# Patient Record
(demographics unavailable — no encounter records)

---

## 2019-11-05 NOTE — [facility]
EXAM: Cervical spine, 4 views.

 

HISTORY: Pain status post fusion.

 

COMPARISON: 8/14/2019

 

FINDINGS: 4 views of the cervical spine are obtained. There is 

instrumented intraspinal fusion and interbody fusion at C5-C6. There is 

minimal retrolisthesis of C2 on C3. There is minimal endplate remodeling 

and there is mild facet arthropathy at multiple levels. There is partial 

visualization of median sternotomy wires. There is calcified plaque within

the carotid bifurcations.

 

IMPRESSION: 

1. Instrumented fusion at C5-C6.

2. Mild multilevel degenerative change.

3. No acute osseous finding.

 

Electronically signed by: Oly Simental MD (11/5/2019 1:24 PM) [facility]

## 2020-02-10 NOTE — [facility]
Cervical spine CT without contrast

 

History: Previous surgery, bilateral cervical radiculopathy 

 

Technique: Noncontrast CT imaging was performed of the cervical spine. 

Multiplanar images are reviewed.  Exposure: One or more of the following 

individualized dose reduction techniques were utilized for this 

examination:  1. Automated exposure control  2. Adjustment of the mA 

and/or kV according to patient size  3. Use of iterative reconstruction 

technique.

 

Comparison: August 14, 2019 exam; January 27, 2020

 

Findings: Cervical vertebral body stature is overall maintained. As seen 

on recent exam, there is anterior cervical fusion hardware at C5-6 and 

also C5-6 interbody graft, interbody fusion not apparent. There is again 

moderate degenerative disc disease at C6-7 and to lesser degree at C2-C3 

and C7-T1, minimally at C3-C4. AP alignment is unchanged, negligible 

posterior subluxation C6 relative to C7. There is adequate alignment of 

the lateral masses of C1 relative to C2. Occipital condylar-C1 

articulation is maintained. Atlantoaxial distance is within normal limits,

associated degenerative change.

 

There is atherosclerotic calcification of the carotid arteries in the neck

bilaterally.

 

C2-3: There is again minimal disc osteophyte complex and shallow 

protrusion. Central canal is again narrowed to about 7 to 8 mm. There is 

mild facet degenerative change bilaterally. Neural foramina are overall 

adequate.

 

C3-C4: There is again disc osteophyte complex and shallow central 

protrusion. Central canal is narrowed to about 8 mm. There is again 

mild-to-moderate facet degenerative change bilaterally. There is 

uncovertebral degenerative change greater on the left. There is again mild

narrowing of the left neural foramen, right neural foramen overall 

adequate.

 

C4-C5: There is again fairly severe left facet degenerative change, to 

lesser degree on the right. There is again very shallow posterior central 

protrusion. Central canal is borderline about 10 mm. There is mild neural 

foramina compromise bilaterally.

 

C5-C6: There are residual minimal posterior osteophytes, central canal 

minimally narrowed about 9 to 10 mm, previously greater degree of spinal 

stenosis by extrusion at this level on the August 2019 exam. There is 

again moderate to severe facet degenerative change bilaterally greater on 

the right. There is uncovertebral degenerative change. There is residual 

moderate to severe left and moderate right neural foramina compromise, 

decreased on the left in the interval.

 

C6-C7: There is again minimal disc osteophyte complex more eccentric to 

the left lateral recess. Central canal is borderline about 10 mm, again 

mild-to-moderate narrowing of the far left lateral recess. There is again 

fairly severe facet degenerative change bilaterally. There is 

uncovertebral degenerative change bilaterally, left greater than right. 

There are again larger osteophytes/disc osteophyte complex in the distal 

left neural foramen and proximal extraforaminal region. There is again 

severe narrowing of the left neural foramen greater distally. There is 

minimal narrowing of the right neural foramen.

 

C7-T1: Spinal canal and neural foramina are adequate.

 

Impression:

1. As seen on more recent exam, there is intact anterior cervical fusion 

hardware at the C5-6 level, also interbody graft at this level.

2. There is multilevel facet and uncovertebral degenerative change 

contributing to multilevel cervical neural foramina compromise as stated, 

more significant narrowing on the left at C6-7 and left greater than right

at C5-C6.

3. There is central canal stenosis on the order of 7-8 mm at C2-3 and to a

somewhat lesser degree at C3-4 as seen previously. There is 

mild-to-moderate narrowing of the far left lateral recess at C6-7 as seen 

previously. There is interval decreased, residual mild spinal stenosis at 

C5-6 by osteophytes.

4. There is multilevel cervical degenerative disc disease greatest at 

C6-7.

5. There is atherosclerotic calcification of the carotid arteries in the 

neck bilaterally.

 

 

 

Electronically signed by: Greg Vazquez MD (2/10/2020 5:03 PM) 

[facility]

## 2020-02-10 NOTE — [facility]
Cervical myelogram

 

History: Cervical radiculopathy

 

COMPARISON: November 5, 2019 radiographs

 

Technique: Patient was informed of the risks to include pain, infection, 

bleeding, seizures, allergic reaction, nerve root injury. All questions 

were answered. Patient signed a written consent form for a cervical 

myelogram. The patient was placed in a prone oblique position. The patient

was prepped and draped in the usual sterile fashion. 1% lidocaine was 

utilized for local anesthesia at the anticipated site of puncture of the 

right L2-L3 interlaminar space. A 19-gauge guiding needle was advanced 

into the soft tissues.   Through the guiding needle,  a 25  gauge Daniele

needle was advanced until there was  return of cerebral spinal fluid. 

Approximately 10 cc Omnipaque 300 were then injected during fluoroscopic 

visualization.  The needles were removed. Patient was repositioned so as 

to allow for the flow of contrast into the cervical spine. Fluoroscopic 

spot images and lateral radiograph were acquired. The patient was then 

transferred to the CT department for CT examination of the cervical spine.

There were no immediate complications.

 

Fluoroscopy time:  35 seconds, 6 images

 

Findings: There again has been anterior cervical fusion at C5-C6. There is

no evidence of myelographic block. There are likely some small anterior 

extradural defects such as C2-3 and C3-4, limited characterization of more

inferior levels due to overlying bone and soft tissues. There is 

multilevel cervical facet degenerative change. There is atherosclerotic 

calcification of the carotid arteries in the neck bilaterally. 

 

As seen on the injection images, there is multilevel lumbar degenerative 

disc disease and spondylosis.

 

Impression: 

 

1. There again has been anterior cervical fusion at C5-C6.

2. There is multilevel cervical facet degenerative change.

 

Electronically signed by: Greg Vazquez MD (2/10/2020 4:21 PM) 

[facility]

## 2020-02-21 NOTE — PAIN
DATE OF SERVICE:  02/20/2020



INITIAL CONSULTATION FOR PAIN CLINIC



CHIEF COMPLAINT:  Neck and bilateral upper extremity pain.



HISTORY OF PRESENT ILLNESS:  This is a 68-year-old male who presents with

history of pain in the base of the neck and bilateral upper extremities since

about 2009.  The patient underwent an anterior cervical diskectomy and fusion in

2015 with good results initially, but the pain returned a few months later and

it has been much worse over the past 2 months now.  The patient reports pain in

the base of the neck radiating to bilateral upper extremities, right essentially

equal to left without any loss of function, but significant fatigability in the

upper extremities as well as difficulty with fine motor movements such as

buttoning a shirt with his hands, especially with his right hand as he is right

hand dominant.  The patient reports he has had physical therapy in the past and

doing some stretching and strengthening exercises, but no formal physical

therapy recently and has been exercising on his own, also taking hydrocodone,

which does decrease the pain some as does Advil, which helps a small amount. 

The patient reports it awakens him from sleep about 3 times a night, can affect

his bowel or bladder control, but no loss of continence.  The patient reports it

does affect his ability to walk at times, but he is not using any assistive

devices.  No falls or stumbles.  The patient reports his disability rating from

0-10, 10 being the worst, is a 10 in all categories, family and home

responsibility, recreation, social activity, sexual behavior, occupation,

self-care and life support activities.  The patient did have MRI scan of the

cervical spine and myelogram of cervical spine also showing some degenerative

disk disease as well as spinal stenosis with previous intact cervical fusion

hardware at C5-C6, interbody graft at that level as well with multilevel facet

and uncovertebral degenerative change with cervical neural foraminal compromise,

more significant narrowing on the left at C6-C7 and left greater than right at

C5-C6 with spinal canal stenosis on the order 7-8 mm at the C2-C3 and somewhat

lesser degree at C3-C4.



PAST MEDICAL HISTORY:  Significant for hypertension, diabetes, hearing loss,

previous coronary artery bypass and reflux.



PAST SURGICAL HISTORY:  Other surgeries include a cochlear implant 2010,

cervical diskectomy and fusion in 2015.



CURRENT MEDICATIONS:  Include insulin, hydrocodone, Aciphex, aspirin, metformin,

rosuvastatin, tamsulosin and hydrochlorothiazide also multivitamins.



ALLERGIES:  The patient has no known drug allergies.



FAMILY HISTORY:  Significant for hypertension.



SOCIAL HISTORY:  The patient drinks alcohol about 3 drinks a week on average. 

Does not smoke, does not use any illegal, illicit or recreational drugs.  He is

single, lives locally in [place], [place].



REVIEW OF SYSTEMS:  The patient's review of systems is positive for those items

mentioned in history of present illness.  All systems reviewed and otherwise

negative.  It is complete, full and well documented on the patient's chart.



PHYSICAL EXAMINATION:

VITAL SIGNS:  The patient's blood pressure 166/75, pulse 73, respirations 18,

temperature 97.9 degrees Fahrenheit, height is 5 feet 10 inches, weight is 182

pounds.

GENERAL:  The patient is awake, alert, oriented, appropriate, very pleasant

demeanor.

HEENT:  Head shows normocephalic, atraumatic.  Extraocular movements are intact

and symmetrical.  Oral cavity:  Mucous membranes moist and pink.  Dentition is

intact.

NECK:  Shows anterior throat supple without palpable lymphadenopathy noted. 

Swallow reflex symmetrical.

CHEST:  Shows normal on inspection.  Breath sounds clear to auscultation

bilaterally.

HEART:  Shows S1, S2 clear.  No murmurs auscultated.

ABDOMEN:  Soft, nontender, nondistended.  No palpable organomegaly is noted.  No

rebound or guarding demonstrated.

BACK:  Shows spine grossly in the midline.  Cervical paraspinous muscle shows

symmetrical on inspection, on palpation shows some moderate tenderness

diffusely, but only diffusely without significant radiation.  The patient does

show good rotational motion of cervical spine, both laterally as well as

extension and flexion without significant difficulty.  The patient's back shows

good forward flexion and rearward extension as well without significant increase

in pain.

EXTREMITIES:  The patient's upper extremities show deep tendon reflexes at 2+ in

the biceps and triceps tendons.  Motor exam is strong with 5/5  strength,

bicep and tricep flexion symmetrical.  Peripheral pulses are 2+ radial

distribution.  No peripheral edema is noted.  Shoulder shrug is strong and

intact without loss of strength on resistance with some minor pain in the base

of the neck bilaterally and into the shoulders, but not into the upper

extremities.  This is true with abduction of shoulder to 90 degrees without loss

of strength on resistance, but with good strength bilaterally.  The patient's

skin shows warm and dry, good turgor.  No edema.  No sores, rashes or bruising

throughout.  Also the patient does have cochlear implant with external battery

on the left.



IMPRESSION:

1.  This is a 68-year-old male with approximate 2-month history of increasing

pain recently in the base of neck and upper extremities bilaterally in a

radicular fashion.

2.  MRI and myelogram of cervical spine as noted.

3.  Hypertension.

4.  Diabetes.



PLAN:  Options were discussed with the patient including conservative medical

managements, physical therapies and interventional techniques.  He would like to

pursue interventional techniques.  We discussed a cervical epidural steroid

injection using description as well as anatomical models to describe the

procedure.  Risks were then discussed including, but not limited to bleeding,

infection, possibility of epidural hematoma, subsequent neurological compromise,

dural puncture, headaches, spinal cord and/or nerve damage, side effects of

steroid medication and poor results regarding pain control.  The patient

understands and wished to proceed.  The patient will return to clinic in

approximately 2 weeks for followup.  He was counseled on return appointment,

activity level and side effects to be aware of.



DIAGNOSES:  Cervical radiculopathy with cervical degenerative disk disease,

cervical spinal stenosis and post-cervical laminectomy syndrome.



PROCEDURE:  Cervical epidural steroid injection, translaminar approach C6-C7

level using C-arm fluoroscopic guidance under sterile prep and drape using local

anesthetic.



MEDICATION INJECTED:  A total of 120 mg Depo-Medrol plus 5 mL of

preservative-free normal saline and 2 mL of contrast.



CONDITION AT DISCHARGE:  Stable.  The patient tolerated the procedure well, had

no complications.

 



______________________________

WALTER SMITH MD



DR:  FAHAD/alecia  JOB#:  [id number] / [id number]

DD:  02/20/2020 15:30  DT:  02/21/2020 00:35

## 2020-03-05 NOTE — PN
DATE:  03/05/2020



PROGRESS NOTE FOR PAIN CLINIC



DIAGNOSES:  Cervical radiculopathy with cervical degenerative disk disease,

cervical spinal stenosis and post-cervical laminectomy syndrome.



HISTORY OF PRESENT ILLNESS:  The patient is a 68-year-old male who returns for

followup status post cervical epidural steroid injection x 1.  The patient

reports only minimal decrease in pain in his neck and bilateral upper

extremities.  The patient reports still significant pain with any activity,

weight lifting any type of weightbearing with the upper extremities, rather

patient reports it is keeping him awake at night, occasionally awakens him, but

not most nights.  The patient reports his pain is a 10 on a scale of 10 at all

times, average, worst and least and has tingling and burning sensation in the

base of neck and shoulders, upper extremities bilaterally, radiating, constant,

becoming severe.  The patient reports no new motor or sensory deficits, no new

changes.



PHYSICAL EXAMINATION:

VITAL SIGNS:  The patient's blood pressure is 173/77, pulse 74, respirations 20,

temperature 98.2 degrees Fahrenheit, weight is 180 pounds.

GENERAL:  The patient is awake, alert, oriented, appropriate, very pleasant

demeanor.

HEENT:  Head shows normocephalic, atraumatic.  Extraocular movements are intact

and symmetrical.  Oral cavity:  Mucous membranes moist and pink.  Dentition is

intact.

NECK:  Shows anterior throat supple without palpable lymphadenopathy noted. 

Swallow reflex symmetrical.

CHEST:  Shows normal on inspection.  Breath sounds clear bilaterally.

HEART:  Shows S1, S2 clear.  No murmurs auscultated.

ABDOMEN:  Soft, nontender, nondistended.

BACK:  Shows spine grossly in the midline.  Normal appearing thoracic kyphosis

and lumbar lordotic curvature.  Cervical lordotic curvature is slightly

flattened.  Cervical paraspinous muscle shows symmetrical with inspection, on

palpation shows some moderate tenderness diffusely bilaterally, but only

diffusely in the low cervical paraspinous muscles without radiation.  The

patient does show good rotational motion of cervical spine, both laterally as

well as extension and flexion without significant difficulty.

EXTREMITIES:  Upper extremities show deep tendon reflexes 2+ in the biceps and

triceps tendons.  Motor exam is strong with  strength rated at 5/5 and

equal.  Peripheral pulses are 2+.  No peripheral edema is noted.



Options were discussed with the patient.  The patient's old chart was reviewed,

his current medication regimen updated.  Current review of systems updated today

as well.  We will proceed with a second in a series of cervical epidural steroid

injection today with fluoroscopic guidance.  Risks were again discussed

including, but not limited to bleeding, infection, possibility of epidural

hematoma, subsequent neurological compromise, dural puncture, headaches, spinal

cord and/or nerve damage, side effects of steroid medication and poor results

regarding pain control.  The patient understands and wished to proceed.  The

patient will return to clinic in approximately 2 weeks for follow up.  He was

counseled on return appointment, activity level and side effects to be aware of.



DIAGNOSES:  Cervical radiculopathy with cervical degenerative disk disease,

cervical spinal stenosis and post-cervical laminectomy syndrome.



PROCEDURE:  Cervical epidural steroid injection, translaminar approach C6-C7

level using C-arm fluoroscopic guidance under sterile prep and drape using local

anesthetic.



MEDICATION INJECTED:  A total of 120 mg Depo-Medrol plus 5 mL of

preservative-free normal saline and 2 mL of contrast.



CONDITION AT DISCHARGE:  Stable.  The patient tolerated procedure well, had no

complications.

 



______________________________

WALTER SMITH MD



DR:  FAHAD/alecia  JOB#:  [id number] / [id number]

DD:  03/05/2020 14:35  DT:  03/05/2020 19:58

## 2020-09-17 NOTE — RAD
EXAM: Limited bone scintigraphy..

 

HISTORY: Neck pain.

 

COMPARISON: 09/17/2020 CT.

 

FINDINGS: 25 mCi Tc-99m MDP was administered intravenously. Scintigraphic 

images of the cervical spine were obtained in multiple projections.

 

There is focal uptake within the lower cervical spine corresponding with 

anterior cervical discectomy and fusion changes at C5-6. There is no 

significant uptake elsewhere in the cervical spine.

 

Uptake at the sternoclavicular and acromioclavicular joints is consistent 

with osteoarthritis. Uptake along the superior alveolar ridge is likely 

odontogenic. There is no evidence of scintigraphic osseous metastatic 

disease.

 

IMPRESSION: 

1. Moderate to intense uptake at the site of C5-6 anterior cervical 

discectomy and fusion is greater than expected given chronicity placement.

This is consistent with residual motion or infection.

 

Electronically signed by: MARBELLA Paula MD (9/17/2020 6:22 PM) 

[facility]

## 2020-09-17 NOTE — RAD
Cervical spine CT without contrast

 

History:Cervical radiculopathy 

 

Technique: Noncontrast CT imaging was performed of the cervical spine. 

Multiplanar images are reviewed.  Exposure: One or more of the following 

individualized dose reduction techniques were utilized for this 

examination:  1. Automated exposure control  2. Adjustment of the mA 

and/or kV according to patient size  3. Use of iterative reconstruction 

technique.

 

Comparison: 2/10/2020

 

Findings: There is again anterior metallic plate and screws at C5-6, also 

C5-6 interbody graft although interbody fusion not apparent. Cervical 

vertebral body stature and AP alignment are maintained. There is again 

moderate degenerative disc disease C6-7, to a lesser degree at C2-3, C3-4,

and C7-T1. No acute cervical spine fracture is identified. Atlantoaxial 

distance is within normal limits. Occipital condylar-C1 articulation is 

preserved. There is adequate alignment of the lateral masses of C1 

relative to C2. There is some atherosclerotic calcification of the carotid

arteries in the neck bilaterally. There is again degree of diffuse 

cervical spinal stenosis on developmental basis.

 

C2-3: There is again disc osteophyte complex and superimposed protrusion 

with resultant central canal stenosis estimated about 6 to 7 mm. Neural 

foramina are adequate. There is facet degenerative change.

 

C3-4: There is again disc osteophyte complex and protrusion, central canal

likely narrowed to about 8 mm. There is again facet degenerative change, 

also uncovertebral degenerative change greater on the left. There is 

probable mild-to-moderate narrowing of the left neural foramen.

 

C4-5: There is again bilateral facet degenerative change. There is likely 

shallow posterior protrusion as seen previously, likely borderline central

canal stenosis about 7 mm. There is probable mild bilateral neural 

foramina compromise.

 

C5-6: There is again overall borderline to mild narrowing of the central 

canal by posterior osteophytes. There is bilateral facet and uncovertebral

degenerative change. There is fairly severe left and likely moderate right

neural foramina compromise.

 

C6-7: There is again disc osteophyte complex, likely borderline central 

canal stenosis and degree of narrowing of the far left lateral recess. 

There is left greater than right uncovertebral degenerative change, also 

bilateral facet degenerative change. There is again severe narrowing of 

the left neural foramen, mild narrowing on the right.

 

C7-T1: Neural foramina and spinal canal are adequate.

 

Impression:

1. Findings are fairly similar compared with previous February 2020 exam. 

There is again anterior cervical fusion hardware at C5-6, interbody graft 

at this level although interbody fusion not apparent. There is other 

cervical degenerative disc disease greatest at C6-7, also multilevel 

spondylosis. There is spinal stenosis about 6 to 7 mm at C3-4, to a 

somewhat lesser degree at C3-4 and C4-5 as described.

2. Facet and uncovertebral degenerative change contributes to multilevel 

cervical neural foramina compromise, more significant narrowing on the 

left at C5-6 and C6-7.

 

 

 

Electronically signed by: Greg Vazquez MD (9/17/2020 5:25 PM) [id number]

## 2020-10-12 NOTE — EKG
[facility]

              [address] [place] [address]

Test Date:    2020-10-12               Test Time:    15:29:51

Pat Name:     HERNANDO LOZANO            Department:   

Patient ID:   [id number]           Room:          

Gender:       M                        Technician:   

:          1951               Requested By: JANNIE SANTANA

Order Number: [id number]           Reading MD:   Severino Dee

                                 Measurements

Intervals                              Axis          

Rate:         61                       P:            41

ID:           [id number]                      QRS:          38

QRSD:         102                      T:            58

QT:           430                                    

QTc:          434                                    

                           Interpretive Statements

SINUS RHYTHM

BORDERLINE INCOMPLETE RIGHT BUNDLE BRANCH BLOCK

Electronically Signed On 10- 10:27:16 CDT by Severino Dee

## 2020-11-23 NOTE — RAD
MR#: [id number]

Account#: [id number]

Accession#: [id number]

Date of Study: 11/23/2020

Ordering Physician: JUAN PABLO JETT, 

Referring Physician: JUAN PABLO JETT, 

Tech: Paulette Tim RT R, [facility]





--------------- APPROVED REPORT --------------





Patient Location : OUT-PATIENT



Indications

Lower Extremity Pain : Bilateral



Deep System

Deep Venous Thrombosis present : No

Deep Venous Reflux present : No



Findings

Grayscale images demonstrate no gross evidence of thrombus in the saphenofemoral junctions.  The bila
teral greater saphenous veins did not demonstrate any evidence of reflux.  The right great saphenous 
vein measures 4.4 mm the left great saphenous vein measures 5 mm.



The right lesser saphenous vein does not show any evidence of reflux and it measures approximately 2.
7 mm.  The left lesser saphenous vein is not well visualized and appears to be consistent with prior 
thrombosis.



Critical Notification

Critical Value: No



<Conclusion>

1.  No evidence of reflux in the bilateral greater and right lesser saphenous veins.  Probable chroni
c thrombotic occlusion of the left lesser saphenous vein (superficial vein).



Signed by : Juan Pablo Jett, 

Electronically Approved : 11/23/2020 17:51:06

## 2020-11-23 NOTE — RAD
INDICATION: Carotid bruit

 

COMPARISON: None.

 

TECHNIQUE: Color, grayscale and doppler ultrasound images obtained of the 

carotid system bilaterally.  Percent stenosis is estimated using criteria 

that correlates with NASCET methodology.

 

FINDINGS:

 

Peak systolic velocities are as follows in cm/s:

 

Right Carotid System:

CCA: 68

ICA: 119



 

ICA/CCA Ratio 1.2

 

Left Carotid System:

CCA: 138

ICA: 142



 

ICA/CCA Ratio 0.9

 

Vertebral arteries are antegrade bilaterally.

Multifocal plaque within the carotid system bilaterally.

 

IMPRESSION:

 

*  No hemodynamically significant stenosis of the internal carotid 

arteries bilaterally.

 

*  Elevated velocities within the external carotid arteries therefore a 

site of narrowing may be present.

 

Electronically signed by: Landon Newell MD (11/23/2020 4:56 PM) [id number]

## 2021-01-04 NOTE — RAD
MR#: [id number]

Account#: [id number]

Accession#: [id number]

Date of Study: 12/29/2020

Ordering Physician: JUANP ABLO JETT, 

Referring Physician: JUAN PABLO JETT, 

Tech: Donald Goldstein MBA, RDMS, RVT, RDCS, RTR





--------------- APPROVED REPORT --------------





Patient Location: OUT-PATIENT



Indications

Claudication:Bilaterally                         



Findings

Abnormal bilateral ABIs with the following results:



Right arm 148, left arm 139



Right ankle PT 87, left ankle PT 99



Index 0.58 on the right side and 0.66 on the left



Critical Notification

Critical Value: No



<Conclusion>

1.  Severe bilaterally abnormal ABIs as noted above.



Signed by : Juan Pablo Jett, 

Electronically Approved : 01/04/2021 09:17:17

## 2021-01-04 NOTE — RAD
MR#: [id number]

Account#: [id number]

Accession#: [id number]

Date of Study: 12/29/2020

Ordering Physician: JUAN PABLO JETT, 

Referring Physician: JUAN PABLO JETT, 

Tech: Donald Goldstein MBA, RDMS, RVT, RDCS, RTR





--------------- APPROVED REPORT --------------





Patient Location: OUT-PATIENT



Indications

Claudication:Bilaterally                         



VELOCITY AND DOPPLER WAVEFORM ANALYSIS

RIGHT cm/secWaveformSeverity LEFT cm/secWaveform Severity 

dCFA 145.0BiphasicdCFA 132.0Monophasic

Prof Fem Art. 71.0MonophasicProf Fem Art. 55.0Monophasic

Fem Art Prox. 66.0MonophasicFem Art Prox. 50.0Monophasic

Fem Art Mid. OccludedFem Art Mid. 208.0Monophasic

Fem Art Dist. 55.0MonophasicFem Art Dist. 87.0Monophasic

Pop Art(Fossa) 34.0MonophasicPop Art(AK) 49.0Monophasic

PTA Prox. 57.0MonophasicPTA Prox. 37.0Monophasic

PTA Dist. 28.0MonophasicPTA Dist. 35.0Monophasic

Per Art Mid. 17.0MonophasicPer Art Mid. 25.0Monophasic

VANESSA Prox. 16.0MonophasicATA Prox. 26.0Monophasic

DPA OccludedDPA 34Monophasic



Findings

Grayscale images of the bilateral lower extremity arterial vessels reveals diffuse moderate atheroscl
erotic plaque with severe plaque localized in the areas of the bilateral superficial femoral arteries
.



On the right side the mid SFA is occluded.  There are diminished monophasic waveforms below the knee 
consistent with more proximal occlusion.  There is three-vessel runoff below the knee but with decrea
sed velocities.



On the left side there is likely a greater than 50% stenosis involving the mid SFA.  Otherwise there 
is three-vessel runoff below the knee although with monophasic waveforms and diminished velocities.



Critical Notification

Critical Value: No



<Conclusion>

1.  Severe bilateral SFA disease



Signed by : Juan Pablo Jett, 

Electronically Approved : 01/04/2021 09:19:16

## 2021-01-18 NOTE — KCIC
EXAM: Cervical spine, 3 views.



HISTORY: Fusion.



COMPARISON: 2/10/2020



FINDINGS: 3 views of the cervical spine are obtained. There is instrumented anterior spinal fusion an
d interbody fusion at C5-C6 and instrumented posterior spinal fusion at C5-T1. There is mild retrolis
thesis of C2 on C3. There is multilevel facet arthropathy. There is calcified scar plaque involving t
he carotid bifurcations. There are median sternotomy wires.



IMPRESSION: 

1. Instrumented fusion at C5-T1, described above. There is no evidence of instrumentation loosening.

2. No acute osseous finding.



Electronically signed by: Oly Simental MD (1/18/2021 1:10 PM) [id number]

## 2021-06-24 NOTE — RAD
XR FOOT_RIGHT 3 VIEWS



History: Reason: pain swelling / Spl. Instructions:  / History: 



Technique: 3 views right foot



Comparison: None.



Findings:

Normal alignment. No fracture. Soft tissues unremarkable. Postop changes talocalcaneal arthrodesis. P
lantar calcaneal spur. Mild midfoot DJD. Vascular calcifications.



Impression: 

1.  No acute osseous abnormality.

2.  Postoperative changes of the hindfoot.

3.  Mild midfoot DJD.



Electronically signed by: Catarino West DO (6/24/2021 9:49 PM) [facility]
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
## 2021-06-25 NOTE — PDOC1
H & P.


DATE OF SERVICE:


DATE: 6/25/21 


TIME: 07:13





HPI:


Mr. Umanzor is a 70 year old male with history of diabetes, complicated by 

chronic kidney disease stage IIIA, peripheral artery disease hypertension, 

hyperlipidemia, coronary disease status post CABG, GERD, BPH, opioid dependence 

who presents to the ED with 1 week history of right foot pain and swelling, 

worsening over that timeframe. Pain is located on the lateral aspect of the foot

as well as the dorsal aspect. Patient denies nausea, vomiting, fever, chills, 

chest pain, shortness of breath, abdominal pain.  Labs remarkable for mild 

leukocytosis, elevated CRP and ESR.  Creatinine is 1.5, which is consistent with

his baseline.  He was admitted for further evaluation and management.  He was 

given vancomycin in the ED.





ROS:


Constitutional: Denies fever, fatigue, chills


HEENT: Denies sore throat, vision changes


Cardio: Denies chest pain, dyspnea with exertion, syncope, palpitations, edema


Pulmonary: Denies shortness of breath, cough, wheezing


GI: Denies nausea, vomiting, diarrhea, constipation


: Denies dysuria, frequency, urgency, incontinence


Skin: Denies new lesions


Neuro: Admits diabetic peripheral neuropathy


MSK: R foot pain





ED COURSE:


As above





PMH:


As above





FAMILY HX:


Noncontributory





SOCIAL HX:


Non-smoker, no significant alcohol or drug use.





SURGICAL HX:


CABG 2012, cervical fusion in 2019, ankle surgery in 2018, tonsillectomy.





MEDS:


Reviewed and reconciled





ALLERGIES:


Reviewed





PE:


Alert, oriented, no acute distress


EOMI, sclera non-icteric


Neck supple


RRR, no murmur


CTAB, no wheezes, crackles or rhonchi


Soft, NT, ND, normal bowel sounds


R dorsal foot with erythema on lateral aspect of the foot, tender and warm to 

touch


Blister with likely underlying R lateral foot wound of uncertain depth


1+ dp pulse on R, unable to appreciate posterior tibial


No edema, cyanosis. Normal capillary refill.


Calm, cooperative, mood/affect within normal limits





ASSESSMENT & PLAN:


Cellulitis of right foot


Blister on lateral plantar R foot with likely underlying R food wound of unknown

depth


Chronic kidney disease stage IIIa, at baseline


Diabetes, mildly uncontrolled, last A1c was 8%


Hypertension


Peripheral artery disease


Hyperlipidemia


Coronary artery disease status post CABG


GERD


BPH


Opioid dependence





Continue home medications


ID consult for antibiotic guidance given chronic kidney disease


Home pain medications corrected to hydrocodone 10/325, patient is not taking 

oxycodone ER


Mild hypokalemia, patient taking HCTZ, will decrease dose and add amlodipine.  

Patient is intolerant of ACE inhibitors due to lower GFR.


Wound care to eval R foot blister/wound


Arterial doppler of RLE d/t decreased pulse





Justifications for Admission


Other Justification














BREANNA HUERTAS MD            Jun 25, 2021 07:23

## 2021-06-25 NOTE — CONS
DATE OF CONSULTATION: 06/25/2021

REFERRING PHYSICIAN:  Dr. Umanzor.



REASON FOR CONSULTATION:  Right foot deep tissue infection.



HISTORY OF PRESENT ILLNESS:  A 70-year-old male with history of diabetes, 

neuropathy, CKD, peripheral arterial disease, hypertension, hyperlipidemia, 

coronary artery disease, GERD, BPH presented to the ER with complaints of right 

foot pain and swelling.  He felt like he had put his foot on a splinter, pain, 

swelling, redness started getting worse.  He denied any fevers, chills, nausea, 

vomiting, headache, sore throat, diarrhea, abdominal pain or  symptoms.  He 

had a white count of 11.9.  Elevated CRP and ESR.  The patient has CKD.  

Creatinine was 1.5.  Foot x-ray showed no acute osseous abnormality, 

postoperative changes of the hindfoot, mild midfoot DJD.  The patient received a

dose of vancomycin.  ID consultation was requested for antibiotic management.  

Today, the patient basically feels the same.



PAST MEDICAL HISTORY:  Diabetes with neuropathy, peripheral arterial disease, 

hypertension, hyperlipidemia, GERD, BPH, status post CABG, history of a left 

ankle surgery about 4 years ago with hardware in place.



FAMILY HISTORY:  Noncontributory.



SOCIAL HISTORY:  Denies smoking, EtOH, or illicit drug use.  Lives with wife at 

home.



PAST SURGICAL HISTORY:  Reviewed.



ALLERGIES:  Reviewed.



CURRENT MEDICATIONS:  Antibiotics, none.  One dose of vancomycin.  Other 

medications reviewed.



PHYSICAL EXAMINATION:

VITAL SIGNS:  Temperature 97.4, pulse 59, respirations 16, blood pressure 

172/69, oxygen saturation 96% on room air.

GENERAL:  Alert, oriented x 3 male lying in bed comfortably, in no acute 

distress.

HEENT:  Normocephalic, atraumatic.  Anicteric.

NECK:  Supple, no JVD.

LUNGS:  Clear bilaterally.  No wheezing.

HEART:  S1, S2.  No gallops or murmurs.

ABDOMEN:  Soft, nontender, nondistended, no guarding.

EXTREMITIES:  Right foot swelling mainly over the dorsum.  There is a blister on

the left fifth metatarsal area.  No draining sinuses noted. Tenderness and 

warmth noted.  No calf tenderness.

NEUROLOGIC:  Alert and oriented x 3, grossly nonfocal.

PSYCHIATRIC:  Calm and cooperative.



LABORATORY DATA:  WBC 11.9, hemoglobin 13, hematocrit 38, platelets 256.  ESR 

28.  Sodium 140, potassium 3.4, chloride 102, bicarbonate 26, BUN 22, creatinine

1.5; baseline is between 1.4-1.6. Glucose 155.  Lactate of 0.6.  C-reactive 

protein 7.7.



IMAGING:  Foot x-ray as above.  Doppler ultrasound arterial is pending at this 

time.



IMPRESSION:

1.  Cellulitis of the right foot.Blister with possible underlying callus on the 
lateral aspect of the right foot,

2.  History of right ankle surgery with hardware in place; Concern for deep 
tissue infection.

3.  Chronic kidney disease.

4.  Diabetes mellitus with neuropathy, uncontrolled.

5.  Hypertension/hyperlipidemia.

6.  Peripheral arterial disease.

7.  Coronary artery disease, status post bypass.

8.  Benign prostatic hypertrophy.



RECOMMENDATIONS:

1.  Obtain CT of right lower extremity.

2.  Start daptomycin and Zosyn.

3.  The patient may need Vascular or Ortho consultation.

4.  Elevate right lower extremity.

5.  Follow up blood cultures.

6.  Monitor labs.



Thank you, Dr. Umanzor, for consulting Infectious Disease to participate in this 

patient's care.  If you have any questions, do not hesitate to contact me.







CHRISSY/VIS/RICH

DR: Rubens   DD: 06/25/2021 10:43

DT: 06/25/2021 11:06   TID: [id number]

GISEL

## 2021-06-25 NOTE — RAD
Exam:  US DPLX ARTR EXTREM LOWER BILAT



History: Reason: decreased pulses, hx of pad, wound / Spl. Instructions:  / History: 



Comparison:  Lower extremity arterial ultrasound 12/29/2020.



Technique: Grayscale, color, and spectral Doppler ultrasound images of the lower extremity arteries.



Findings: Peak systolic velocities (cm/s) and waveforms in the lower extremities:



Right:

Common femoral artery: 178, monophasic

Profunda femoris artery: 112, monophasic

Proximal superficial femoral artery: 126, monophasic

Mid superficial femoral artery: Occluded

Distal superficial femoral artery: Occluded 

Popliteal artery: 115, monophasic

Posterior tibial artery: 63 proximally and 37 distally, monophasic 

Peroneal artery: 28, monophasic 

Anterior tibial artery: 35, monophasic 

Dorsalis pedis artery: 10, monophasic 



Left:

Common femoral artery: 171, monophasic

Profunda femoris artery: 95, monophasic

Proximal superficial femoral artery: 99, monophasic

Mid superficial femoral artery: 42, monophasic

Distal superficial femoral artery: 68, monophasic

Popliteal artery: 87, monophasic

Posterior tibial artery: 54 proximally and 34 distally, monophasic

Peroneal artery: 37, monophasic

Anterior tibial artery: 88, monophasic

Dorsalis pedis artery: 34, monophasic



Impression:

1. Peripheral arterial disease bilaterally with abnormal monophasic waveforms throughout both lower e
xtremities suspicious for aortoiliac disease. Consider a CT angiogram or conventional angiogram to fu
rther evaluate.

2. 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

3. 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 
and dorsalis pedis arteries.



Electronically signed by: Kareen Palma MD (6/25/2021 11:51 AM) [id number]

## 2021-06-25 NOTE — RAD
STUDY: CT of right lower extremity without contrast



INDICATION: Right foot infection, deep, history of surgery



COMPARISON: Right foot radiograph 6/24/2021



TECHNIQUE: Axial CT imaging of the right ankle and foot performed without contrast. Coronal and sagit
angela reformats were obtained.



One or more of the following individualized dose reduction techniques were utilized for this examinat
ion:  



1. Automated exposure control

2. Adjustment of the mA and/or kV according to patient size

3. Use of iterative reconstruction technique.



FINDINGS:



Bones: 



There are surgical changes of the talar arthrodesis. Screws traversing the subtalar joint are intact.
 There is osseous bridging across the joint. A lucent tract just cranial and anterior to the tip of t
he more inferolateral screw may be from prior hardware. There is a lucent tract extending from the ti
p of the more medial screw to the medial talar dome, also possibly sequela of previously removed hard
ware. There are small subchondral cysts in the lateral talar dome. No acute fracture. Mild degenerati
ve joint disease of the midfoot. There is no osseous destruction to suggest acute osteomyelitis.



Mild thickening of the peroneal tendons and Achilles tendons may reflect tendinopathy. The medial fle
xor tendons are grossly intact. There is subcutaneous edema and soft tissue thickening at the anterio
r aspect of ankle with small radiopaque densities along the course of the extensor hallucis longus te
ndon, which is difficult to visualize in this region but intact distally. Correlate for postoperative
 changes. The remaining extensor tendons are intact. Mild diffuse subcutaneous edema, greatest dorsal
ly and laterally. There is no definite drainable fluid collection. No definite joint effusion. Diffus
e muscular atrophy. No soft tissue gas. Vascular calcifications are noted.



IMPRESSION:



1. No osseous destruction to suggest osteomyelitis. There is mild diffuse subcutaneous edema. No drai
nable fluid collection or soft tissue gas.



2. Surgical changes of subtalar arthrodesis. No definite hardware complication. There are cystic weiner
ges at the lateral talar dome which may be degenerative or osteochondral lesion. Mild degenerative manoj
int disease of the midfoot.



3. Thickening of the peroneal tendons and Achilles tendon suspicious for tendinopathy.



4. Subcutaneous edema or soft tissue thickening with small radiopaque densities at the anterior tibio
talar joint along the extensor hallucis longus tendon. The tendon itself is difficult to visualize in
 this region but intact distally. Correlate for prior debridement or tendon repair in this region.



Electronically signed by: Kareen Palma MD (6/25/2021 1:14 PM) [id number]

## 2021-06-26 NOTE — PDOC
Infectious Disease Note


Subjective:


Subjective


Patient feels better today


Denies any complaints





Vital Signs:


Vital Signs





Vital Signs








  Date Time  Temp Pulse Resp B/P (MAP) Pulse Ox O2 Delivery O2 Flow Rate FiO2


 


6/26/21 07:00 98.5 64 18 146/59 (88) 97 Room Air  





 98.5       











Physical Exam:


PHYSICAL EXAM


GENERAL:  Alert, oriented x 3 male lying in bed comfortably, in no acute 


distress.


HEENT:  Normocephalic, atraumatic.  Anicteric.


NECK:  Supple, no JVD.


LUNGS:  Clear bilaterally.  No wheezing.


HEART:  S1, S2.  No gallops or murmurs.


ABDOMEN:  Soft, nontender, nondistended, no guarding.


EXTREMITIES:  Right foot swelling mainly over the dorsum slightly improved.  

There is a blister 


With possible underlying callus on the left fifth metatarsal area.  No draining 

sinuses noted. Tenderness and 


warmth noted.  No calf tenderness.


NEUROLOGIC:  Alert and oriented x 3, grossly nonfocal.


PSYCHIATRIC:  Calm and cooperative.





Medications:


Inpatient Meds:


Medications reviewed.





Labs:


Lab





Laboratory Tests








Test


 6/25/21


12:37 6/25/21


17:42 6/25/21


20:21


 


Glucose (Fingerstick)


 165 mg/dL


(70-99) 171 mg/dL


(70-99) 168 mg/dL


(70-99)








Micro


CT RLE


IMPRESSION:





1. No osseous destruction to suggest osteomyelitis. There is mild diffuse 

subcutaneous edema. No drainable fluid collection or soft tissue gas.





2. Surgical changes of subtalar arthrodesis. No definite hardware complication. 

There are cystic changes at the lateral talar dome which may be degenerative or 

osteochondral lesion. Mild degenerative joint disease of the midfoot.





3. Thickening of the peroneal tendons and Achilles tendon suspicious for 

tendinopathy.





4. Subcutaneous edema or soft tissue thickening with small radiopaque densities 

at the anterior tibiotalar joint along the extensor hallucis longus tendon. The 

tendon itself is difficult to visualize in this region but intact distally. 

Correlate for prior debridement or tendon repair in this region.





Objective:


Assessment:





1.  Cellulitis of the right foot.Blister with possible underlying callus on the 

lateral aspect of the right foot,


2.  History of right ankle surgery with hardware in place; Concern for deep 

tissue infection.


3.  Chronic kidney disease.


4.  Diabetes mellitus with neuropathy, uncontrolled.


5.  Hypertension/hyperlipidemia.


6.  Peripheral arterial disease.


7.  Coronary artery disease, status post bypass.


8.  Benign prostatic hypertrophy.





Plan:


Plan of Care


Continue daptomycin and Zosyn.


Wound care as directed by wound team


Consult vascular surgery


Elevate right lower extremity.


 Follow up blood cultures.


 Monitor labs.


Discussed with wife at bedside


Discussed with nursing staff











MOHINI GARCÍA MD           Jun 26, 2021 08:28

## 2021-06-26 NOTE — PDOC
Provider Note


Date of Service:


DATE: 6/26/21 


TIME: 14:44


Provider Note


vss, no temp, labs same, glucose ok on current insulin- cults neg, R foot seems 

better- will open blister in am and culture fluid





Justifications for Admission


Other Justification














PAULETTE MERA MD             Jun 26, 2021 14:55

## 2021-06-27 NOTE — PDOC
Provider Note


Date of Service:


DATE: 6/27/21 


TIME: 07:56


Provider Note


no temp, glucose control good, labs ok- R foot opened re 15 blade, thin dark 

blood/no pus, cultured and dressed- discussed need for vasc consult as he has 

bilat IC in both thighs, likely distal aortic disease-





Justifications for Admission


Other Justification














PAULETTE MERA MD             Jun 27, 2021 08:17

## 2021-06-27 NOTE — PDOC
Infectious Disease Note


Subjective:


Subjective


Patient feels better 


Underwent blister drainage by Dr. Newell earlier today





Vital Signs:


Vital Signs





Vital Signs








  Date Time  Temp Pulse Resp B/P (MAP) Pulse Ox O2 Delivery O2 Flow Rate FiO2


 


6/27/21 08:28  66  161/69    


 


6/27/21 08:00      Room Air  


 


6/27/21 08:00 98.1  18  98   





 98.1       











Physical Exam:


PHYSICAL EXAM


GENERAL:  Alert, oriented x 3 male lying in bed comfortably, in no acute 


distress.


HEENT:  Normocephalic, atraumatic.  Anicteric.


NECK:  Supple, no JVD.


LUNGS:  Clear bilaterally.  No wheezing.


HEART:  S1, S2.  No gallops or murmurs.


ABDOMEN:  Soft, nontender, nondistended, no guarding.


EXTREMITIES:  Right foot swelling mainly over the dorsum slightly improved.  

There is a blister 


With possible underlying callus on the left fifth metatarsal area.  No draining 

sinuses noted. Tenderness and 


warmth noted.  No calf tenderness.


NEUROLOGIC:  Alert and oriented x 3, grossly nonfocal.


PSYCHIATRIC:  Calm and cooperative.





Medications:


Inpatient Meds:


Medications reviewed.





Labs:


Lab





Laboratory Tests








Test


 6/26/21


11:05 6/26/21


16:40 6/26/21


20:57 6/27/21


07:01


 


Glucose (Fingerstick)


 207 mg/dL


(70-99) 270 mg/dL


(70-99) 145 mg/dL


(70-99) 125 mg/dL


(70-99)








Micro


CT RLE


IMPRESSION:





1. No osseous destruction to suggest osteomyelitis. There is mild diffuse 

subcutaneous edema. No drainable fluid collection or soft tissue gas.





2. Surgical changes of subtalar arthrodesis. No definite hardware complication. 

There are cystic changes at the lateral talar dome which may be degenerative or 

osteochondral lesion. Mild degenerative joint disease of the midfoot.





3. Thickening of the peroneal tendons and Achilles tendon suspicious for 

tendinopathy.





4. Subcutaneous edema or soft tissue thickening with small radiopaque densities 

at the anterior tibiotalar joint along the extensor hallucis longus tendon. The 

tendon itself is difficult to visualize in this region but intact distally. 

Correlate for prior debridement or tendon repair in this region.





Objective:


Assessment:





1.  Cellulitis of the right foot.Blister with possible underlying callus on the 

lateral aspect of the right foot,


2.  History of right ankle surgery with hardware in place; Concern for deep 

tissue infection.


3.  Chronic kidney disease.


4.  Diabetes mellitus with neuropathy, uncontrolled.


5.  Hypertension/hyperlipidemia.


6.  Peripheral arterial disease.


7.  Coronary artery disease, status post bypass.


8.  Benign prostatic hypertrophy.





Plan:


Plan of Care


Continue daptomycin and Zosyn.


Wound care as directed by wound team


Consult vascular surgery


Follow-up blister cultures


Elevate right lower extremity.


 Follow up blood cultures.


 Monitor labs.





Discussed with nursing staff











MOHINI GARCÍA MD           Jun 27, 2021 11:02

## 2021-06-28 NOTE — PDOC
Provider Note


Date of Service:


DATE: 6/28/21 


TIME: 08:17


Provider Note


vss,no temp, wound cult pending- glucose good despite no lantus x 2 days- he 

admits to poor diet and coke at home- cont same meds, vascular consult pending





Justifications for Admission


Other Justification














PAULETTE MERA MD             Jun 28, 2021 08:26

## 2021-06-28 NOTE — PDOC2
CONSULT


Date of Service


Date of Service


DATE: 6/28/21 


TIME: 10:24





Reason for Consult


Reason for Consult:


Peripheral arterial disease





Referring Physician


Referring Physician:


Dr. Olivera





Identification/Chief Complaint


Chief Complaint


Right foot wound





Source


Source:  Patient





History of Present Illness


Reason for Visit:


Pleasant 70-year-old male with history of diabetes, complicated by chronic 

kidney disease stage IIIA, peripheral artery disease hypertension, 

hyperlipidemia, coronary disease status post CABG, GERD, BPH, opioid dependence 

who was admitted for a right foot wound/infection.  We have been asked to 

evaluate the patient regarding this and his arterial duplex that shows 

significant peripheral arterial disease.  His duplex shows chronic right SFA 

occlusion, monophasic flow throughout suggestive of inflow disease.  He had ABIs

in 2020 with results of 0.58 on the right and 0.66 on the left.  Patient 

presented with a right foot wound on the lateral aspect where he thought he had 

a splinter which then turned into a blister and had some surrounding redness 

which brought him into the hospital.  He denies any fevers or chills, abdominal 

pain, chest pain, shortness of breath.  He did have associated drainage from the

blister.  The blister has been popped by Dr. Newell.  He has been started on IV

antibiotics.  White blood cell count is 10.2, creatinine is 1.4.  He is also had

a carotid duplex in November 2020 that did not show significant stenosis.  He 

has had a CT of his extremity that does not show any gas or fluid or osseous 

abnormalities near the foot wound.  Does have history of right ankle surgery 

with hardware, as seen on plain x-ray.  Denies history of stroke.  He reports he

can walk approximately 100 yards before he gets cramping in his calves sometimes

thighs and he has to rest before proceeding.





Past Medical History


Cardiovascular:  CAD, HTN, Hyperlipidemia


Pulmonary:  No pertinent hx


CENTRAL NERVOUS SYSTEM:  Other


GI:  GERD


Heme/Onc:  No pertinent hx


Hepatobiliary:  No pertinent hx


Psych:  No pertinent hx


Musculoskeletal:  Osteoarthritis


Rheumatologic:  No pertinent hx


Infectious disease:  No pertinent hx


Renal/:  No pertinent hx


Endocrine:  Diabetes





Past Surgical History


Past Surgical History:  CABG, Other (Right ankle with hardware)





Family History


Family History


Denies family history of stroke or heart disease





Social History


Social History


Denies currently smoking or ever smoking


ALCOHOL:  occassional


Drugs:  None


Lives:  with Family





Current Problem List


Problem List


Problems


Medical Problems:


(1) Type 2 diabetes mellitus


Status: Acute  











Current Medications


Current Medications





Current Medications


Vancomycin HCl 2 gm/Sodium Chloride 500 ml @  250 mls/hr 1X  ONCE IV  Last 

administered on 6/24/21at 21:54;  Start 6/24/21 at 21:15;  Stop 6/24/21 at 

23:14;  Status DC


Ondansetron HCl (Zofran) 4 mg PRN Q8HRS  PRN IV NAUSEA/VOMITING Last 

administered on 6/25/21at 17:38;  Start 6/24/21 at 22:45;  Stop 6/25/21 at 

22:44;  Status DC


Acetaminophen/ Hydrocodone Bitart (Lortab 5/325) 1 tab 1X  ONCE PO  Last 

administered on 6/25/21at 00:35;  Start 6/25/21 at 00:30;  Stop 6/25/21 at 

00:32;  Status DC


Aspirin (Aspirin Chewable) 81 mg DAILY08 PO  Last administered on 6/28/21at 

08:29;  Start 6/25/21 at 08:00


Docusate Sodium (Colace) 100 mg BID PO  Last administered on 6/28/21at 08:30;  

Start 6/25/21 at 09:00


Insulin Glargine (Lantus Syringe) 90 unit HS SQ  Last administered on 6/25/21at 

21:22;  Start 6/25/21 at 21:00;  Stop 6/26/21 at 14:58;  Status DC


Metformin HCl (Glucophage) 500 mg BIDWMEALS PO  Last administered on 6/26/21at 

08:34;  Start 6/25/21 at 08:00;  Stop 6/26/21 at 14:43;  Status DC


Methocarbamol (Robaxin) 750 mg PRN TID  PRN PO MUSCLE SPASMS;  Start 6/25/21 at 

06:30


Oxycodone HCl (OxyCONTIN) 10 mg BID PO ;  Start 6/25/21 at 09:00;  Stop 6/25/21 

at 07:12;  Status DC


Oxycodone/ Acetaminophen (Percocet 5/325) 1 tab PRN Q6HRS  PRN PO PAIN Last 

administered on 6/25/21at 06:44;  Start 6/25/21 at 06:30;  Stop 6/25/21 at 

07:12;  Status DC


Tamsulosin HCl (Flomax) 0.4 mg HS PO  Last administered on 6/27/21at 20:44;  

Start 6/25/21 at 21:00


Hydrochlorothiazide (Hydrodiuril) 25 mg DAILY PO ;  Start 6/25/21 at 09:00;  

Stop 6/25/21 at 07:23;  Status DC


Multivitamins (Thera M Plus) 1 tab DAILY PO  Last administered on 6/28/21at 

08:29;  Start 6/25/21 at 09:00


Pregabalin (Lyrica) 100 mg TID PO  Last administered on 6/26/21at 14:40;  Start 

6/25/21 at 09:00;  Stop 6/26/21 at 14:43;  Status DC


Atorvastatin Calcium (Lipitor) 40 mg QHS PO  Last administered on 6/27/21at 

20:45;  Start 6/25/21 at 21:00


Acetaminophen/ Hydrocodone Bitart (Lortab 10/325) 1 tab PRN Q6HRS  PRN PO 

MODERATE TO SEVERE PAIN Last administered on 6/28/21at 05:26;  Start 6/25/21 at 

07:15


Hydrochlorothiazide (Hydrodiuril) 12.5 mg DAILY PO  Last administered on 

6/28/21at 08:30;  Start 6/25/21 at 09:00


Amlodipine Besylate (Norvasc) 5 mg DAILY PO  Last administered on 6/28/21at 

08:36;  Start 6/25/21 at 09:00


Daptomycin 490 mg/ Sodium Chloride 50 ml @  100 mls/hr Q24H IV  Last 

administered on 6/27/21at 11:52;  Start 6/25/21 at 12:00


Piperacillin Sod/ Tazobactam Sod 3.375 gm/Sodium Chloride 50 ml @  100 mls/hr 

Q6HRS IV  Last administered on 6/28/21at 05:16;  Start 6/25/21 at 12:00


Lactobacillus Rhamnosus (Culturelle) 1 cap BID PO  Last administered on 

6/28/21at 08:29;  Start 6/25/21 at 21:00


Ondansetron HCl (Zofran) 4 mg PRN Q4HRS  PRN IVP NAUSEA/VOMITING Last 

administered on 6/25/21at 23:18;  Start 6/25/21 at 21:30


Dextrose (Dextrose 50%-Water Syringe) 12.5 gm PRN Q15MIN  PRN IV SEE COMMENTS;  

Start 6/26/21 at 13:00;  Stop 6/27/21 at 15:46;  Status DC


Insulin Human Lispro (HumaLOG) 0-7 UNITS TIDWMEALS SQ  Last administered on 

6/27/21at 17:00;  Start 6/26/21 at 17:00


Dextrose (Dextrose 50%-Water Syringe) 12.5 gm PRN Q15MIN  PRN IV SEE COMMENTS;  

Start 6/26/21 at 13:15


Metformin HCl (Glucophage) 1,000 mg BIDWMEALS PO ;  Start 6/26/21 at 17:00;  

Stop 6/26/21 at 17:02;  Status DC


Pregabalin (Lyrica) 50 mg TID PO  Last administered on 6/28/21at 08:30;  Start 

6/26/21 at 15:00


Pantoprazole Sodium (Protonix) 40 mg Q24H PO  Last administered on 6/27/21at 

17:47;  Start 6/26/21 at 18:30


Insulin Glargine (Lantus Syringe) 95 unit HS SQ ;  Start 6/26/21 at 21:00


Metformin HCl (Glucophage) 500 mg DAILYWBKFT PO  Last administered on 6/28/21at 

08:29;  Start 6/27/21 at 08:00





Active Scripts


Active


Oxycontin ** (Oxycodone HCl) 10 Mg Tab.er.12h 1 Tab PO BID MDD 2 Tablet(s) 30 

Days


Dok (Docusate Sodium) 100 Mg Capsule 100 Mg PO BID


Methocarbamol 750 Mg Tablet 750 Mg PO TID PRN


Reported


Lyrica (Pregabalin) 100 Mg Capsule 1 Cap PO TID


Percocet 5-325 Mg Tablet ** (Oxycodone/Acetaminophen) 1 Each Tablet 1 Tab PO PRN

Q6HRS PRN


Metformin Hcl 500 Mg Tablet 500 Mg PO BIDWMEALS


Crestor (Rosuvastatin Calcium) 10 Mg Tablet 1 Tab PO DAILY


Hydrochlorothiazide Tablet (Hydrochlorothiazide) 50 Mg Tablet 25 Mg PO DAILY


Flomax (Tamsulosin Hcl) 0.4 Mg Cap.er.24h 1 Cap PO HS


Multi-Day Vitamins (Multivitamin) 1 Each Tablet 1 Tab PO DAILY


Aspirin 81 Mg Tab.chew 1 Tab PO DAILY


Lantus (Insulin Glargine,Hum.rec.anlog) 100 Unit/1 Ml Vial 90 Unit SQ HS





Allergies


Allergies:  


Coded Allergies:  


     No Known Drug Allergies (Unverified , 10/21/20)





ROS


General:  No: Chills, Other (Fever)


Hematological and Lymphatic:  No: Bleeding Problems, Blood Clots, Brusing


Respiratory:  No: Cough, Shortness of breath


Cardiovascular:  No Chest Pain


Gastrointestinal:  No Nausea, No Vomiting, No Abdominal Pain


Musculoskeletal:  Yes Pain In: (Claudication)


Neurological:  No Speech Problems


Skin:  Yes Rash, Yes Skin Lesion Changes





Physical Exam


General:  Alert, Oriented X3, Cooperative, No acute distress


HEENT:  Atraumatic, EOMI


Lungs:  Clear to auscultation, Normal air movement (Room air)


Heart:  Regular rate, No murmurs, Other (No carotid bruits)


Abdomen:  Soft, No tenderness


Extremities:  No cyanosis, No edema, Other (Cannot appreciate pedal pulses, 

femoral pulses very faint.  Bilateral feet are warm, with motor function and 

sensation intact.  Hand-held Doppler with very blunted monophasic flow to the 

right AT and PT.  Left leg with more brisk monophasic flow to DP and PT.)


Skin:  Other (No left foot wounds. Right foot has small lateral blister with 

minimal drainage, the overlying skin was removed and wound bed cleaned. The 

wound bed is clean without penetrating ulcer or further draiange, superficial. )


Neuro:  Normal speech, Normal tone, Sensation intact


Psych/Mental Status:  Mental status NL, Mood NL





Vitals


VITALS





Vital Signs








  Date Time  Temp Pulse Resp B/P (MAP) Pulse Ox O2 Delivery O2 Flow Rate FiO2


 


6/28/21 08:36  53  143/62    


 


6/28/21 07:00 97.7  18  94 Room Air  





 97.7       











Labs


Labs





Laboratory Tests








Test


 6/26/21


11:05 6/26/21


16:40 6/26/21


20:57 6/27/21


07:01


 


Glucose (Fingerstick)


 207 mg/dL


(70-99) 270 mg/dL


(70-99) 145 mg/dL


(70-99) 125 mg/dL


(70-99)


 


Test


 6/27/21


11:18 6/27/21


16:48 6/27/21


20:36 6/28/21


07:17


 


Glucose (Fingerstick)


 165 mg/dL


(70-99) 180 mg/dL


(70-99) 167 mg/dL


(70-99) 128 mg/dL


(70-99)








Laboratory Tests








Test


 6/27/21


11:18 6/27/21


16:48 6/27/21


20:36 6/28/21


07:17


 


Glucose (Fingerstick)


 165 mg/dL


(70-99) 180 mg/dL


(70-99) 167 mg/dL


(70-99) 128 mg/dL


(70-99)











Assessment/Plan


Assessment/Plan


PAD





70-year-old male with right foot wound, this was cleaned up at bedside today and

is clean and superficial without signs of deeper infection. Recommend wound 

care, does not need surgical debridement.  He does have significant peripheral 

arterial disease, especially on the right with chronic right SFA occlusion and 

possible inflow disease with monophasic flow throughout and very blunted 

monophasic AT and PT.  He will need an aortogram with possible intervention.  He

does have chronic kidney disease, creatinine is 1.4 today. We will limit 

contrast exposure as much as possible and we will plan for preprocedure hydra

tion.  I discussed all this with the patient and his wife today at bedside, 

including risks/benefits of procedure, details of procedure, possible outcomes. 

They exhibited understanding and agreed to proceed as recommended.  We will plan

for angiogram with possible right leg intervention on Wednesday with Dr. Etienne. 

Will get morning labs.  He had a carotid duplex in November 2020 without 

significant stenosis, would recommend repeating this again in November (1 year 

from previous).











EARLINE DOCKERY             Jun 28, 2021 10:40

## 2021-06-28 NOTE — PDOC
Infectious Disease Note


Subjective


Subjective


Patient feels better





ROS


ROS


no n/v/d/sob





Vital Sign


Vital Signs





Vital Signs








  Date Time  Temp Pulse Resp B/P (MAP) Pulse Ox O2 Delivery O2 Flow Rate FiO2


 


6/28/21 08:36  53  143/62    


 


6/28/21 07:00 97.7  18  94 Room Air  





 97.7       











Physical Exam


PHYSICAL EXAM


GENERAL:  Alert, oriented x 3 male lying in bed comfortably, in no acute 


distress.


HEENT:  Normocephalic, atraumatic.  Anicteric.


NECK:  Supple, no JVD.


LUNGS:  Clear bilaterally.  No wheezing.


HEART:  S1, S2.  No gallops or murmurs.


ABDOMEN:  Soft, nontender, nondistended, no guarding.


EXTREMITIES:  Right foot swelling mainly over the dorsum slightly improved.  

There is a blister 


With possible underlying callus on the left fifth metatarsal area.  No draining 

sinuses noted. Tenderness and 


warmth noted.  No calf tenderness.


NEUROLOGIC:  Alert and oriented x 3, grossly nonfocal.


PSYCHIATRIC:  Calm and cooperative.





Labs


Lab





Laboratory Tests








Test


 6/27/21


11:18 6/27/21


16:48 6/27/21


20:36 6/28/21


07:17


 


Glucose (Fingerstick)


 165 mg/dL


(70-99) 180 mg/dL


(70-99) 167 mg/dL


(70-99) 128 mg/dL


(70-99)








Micro





Microbiology


6/25/21 Blood Culture - Preliminary, Resulted


          NO GROWTH AFTER 3 DAYS





Objective


Assessment





1.  Cellulitis of the right foot.Blister with possible underlying callus on the 

lateral aspect of the right foot,


2.  History of right ankle surgery with hardware in place; Concern for deep 

tissue infection.


3.  Chronic kidney disease.


4.  Diabetes mellitus with neuropathy, uncontrolled.


5.  Hypertension/hyperlipidemia.


6.  Peripheral arterial disease.


7.  Coronary artery disease, status post bypass.


8.  Benign prostatic hypertrophy.





Plan


Plan of Care


Continue daptomycin and Zosyn.,,, soon to change to po


d/w wife


Wound care as directed by wound team





d/w Vascular surgery





Follow-up blister cultures


Elevate right lower extremity.


 Follow up blood cultures.


 Monitor labs.





Discussed with nursing staff











LIBRA GARCÍA MD               Jun 28, 2021 09:24

## 2021-06-29 NOTE — PDOC
Infectious Disease Note


Subjective


Subjective


Patient feels better





ROS


ROS


no n/v/d/sob





Vital Sign


Vital Signs





Vital Signs








  Date Time  Temp Pulse Resp B/P (MAP) Pulse Ox O2 Delivery O2 Flow Rate FiO2


 


6/29/21 04:27   16   Room Air  


 


6/29/21 03:00 97.7 52  139/56 (83) 97   





 97.7       











Physical Exam


PHYSICAL EXAM


GENERAL:  Alert, oriented x 3 male lying in bed comfortably, in no acute 


distress.


HEENT:  Normocephalic, atraumatic.  Anicteric.


NECK:  Supple, no JVD.


LUNGS:  Clear bilaterally.  No wheezing.


HEART:  S1, S2.  No gallops or murmurs.


ABDOMEN:  Soft, nontender, nondistended, no guarding.


EXTREMITIES:  Right foot swelling mainly over the dorsum slightly improved.  

There is a blister 


With possible underlying callus on the left fifth metatarsal area.  No draining 

sinuses noted. Tenderness and 


warmth noted.  No calf tenderness.


NEUROLOGIC:  Alert and oriented x 3, grossly nonfocal.


PSYCHIATRIC:  Calm and cooperative.





Labs


Lab





Laboratory Tests








Test


 6/28/21


10:51 6/28/21


16:59 6/28/21


20:43 6/29/21


06:20


 


Glucose (Fingerstick)


 168 mg/dL


(70-99) 146 mg/dL


(70-99) 211 mg/dL


(70-99) 





 


Sodium Level


 


 


 


 142 mmol/L


(136-145)


 


Potassium Level


 


 


 


 3.4 mmol/L


(3.5-5.1)


 


Chloride Level


 


 


 


 106 mmol/L


()


 


Carbon Dioxide Level


 


 


 


 24 mmol/L


(21-32)


 


Anion Gap    12 (6-14) 


 


Blood Urea Nitrogen


 


 


 


 23 mg/dL


(8-26)


 


Creatinine


 


 


 


 1.5 mg/dL


(0.7-1.3)


 


Estimated GFR


(Cockcroft-Gault) 


 


 


 46.3 





 


Glucose Level


 


 


 


 87 mg/dL


(70-99)


 


Calcium Level


 


 


 


 8.4 mg/dL


(8.5-10.1)


 


Test


 6/29/21


07:48 


 


 





 


Glucose (Fingerstick)


 76 mg/dL


(70-99) 


 


 











Micro





Microbiology


6/25/21 Blood Culture - Preliminary, Resulted


          NO GROWTH AFTER 3 DAYS





Objective


Assessment





1.  Cellulitis of the right foot.Blister with possible underlying callus on the 

lateral aspect of the right foot,


2.  History of right ankle surgery with hardware in place; Concern for deep 

tissue infection.


3.  Chronic kidney disease.


4.  Diabetes mellitus with neuropathy, uncontrolled.


5.  Hypertension/hyperlipidemia.


6.  Peripheral arterial disease.


7.  Coronary artery disease, status post bypass.


8.  Benign prostatic hypertrophy.





Plan


Plan of Care


dc daptomycin and Zosyn.,,, soon to change to po


d/w wife


Wound care as directed by wound team





d/w Vascular surgery





Follow-up blister cultures


Elevate right lower extremity.


 Follow up blood cultures.


 Monitor labs.





Discussed with nursing staff











LIBRA GARCÍA MD               Jun 29, 2021 08:43

## 2021-06-29 NOTE — PDOC
Provider Note


Date of Service:


DATE: 6/29/21 


TIME: 09:08


Provider Note


resumed higher dose lyrica he was on at home, reduced lantus re better diet 

here- vasc input noted, rest same





Justifications for Admission


Other Justification














PAULETTE MERA MD             Jun 29, 2021 09:08

## 2021-06-30 NOTE — OP
DATE OF SURGERY: 06/30/2021

PREOPERATIVE DIAGNOSES:

1.  Atherosclerosis with bilateral lower extremity lifestyle-limiting 

claudication.

2.  Hypertension.

3.  Hyperlipidemia.

4.  Diabetes mellitus.



POSTOPERATIVE DIAGNOSES:

1.  Atherosclerosis with bilateral lower extremity lifestyle-limiting 

claudication.

2.  Hypertension.

3.  Hyperlipidemia.

4.  Diabetes mellitus.



PROCEDURES PERFORMED:

1.  Percutaneous endovascular stent placement into the right common iliac artery

using a 7 x 39 balloon expandable stent in a kissing fashion (CPT code 90970).

2.  Percutaneous endovascular stent placement into the left common iliac artery 

using 7 x 39 balloon expandable stent in a kissing fashion (CPT code 43735).

3.  Percutaneous endovascular balloon angioplasty of the right external iliac 

artery using a 7 x 40 Medtronic IN.PACT Admiral drug-eluting balloon (CPT code 

87217).

4.  Abdominal aortogram (CPT code 77859-64).

5.  Selective right lower extremity angiogram (CPT code 20975-76).

6.  Moderate sedation for the initial 15 minutes (CPT code 87699).

7.  Moderate sedation for the subsequent 15 minutes for a total procedure time 

of 69 minutes (CPT code 37006).

8.  Ultrasound-guided access of bilateral common femoral arteries (CPT code 

28749-37-94).

9.  Bilateral common femoral artery StarClose.



ANESTHESIA:  Local with moderate sedation for a total of 3 of Versed and 150 mcg

of fentanyl and total procedure moderate sedation time of 69 minutes.



ESTIMATED BLOOD LOSS:  Minimal.



SPECIMENS:  None.



COMPLICATIONS:  None.



PREOPERATIVE INDICATIONS:  The patient is a 70-year-old male who I was asked to 

perform revascularization of his lower extremities due to right lower extremity 

tissue loss.  The patient was consented for the procedure and understood all 

risks, benefits and alternatives of the procedure prior to proceeding.



DESCRIPTION OF PROCEDURE:  The patient was brought to the catheterization suite 

and placed in supine position.  After establishing appropriate moderate 

sedation, bilateral femoral access sites were prepped and draped in sterile 

fashion.  Next, a timeout procedure was performed.  Following this, using a 

sterile ultrasound probe, the left common femoral artery was visualized and then

1% lidocaine was infiltrated underneath the skin.  Next, the left common femoral

artery was accessed using micropuncture needle under direct ultrasound guidance.

 Following this, a microwire was inserted under fluoroscopy guidance.  Next, a 

small skin incision was made and the needle was removed and a microsheath was 

inserted.  Next, a 0.035 Bentson wire was inserted into abdominal aorta under 

fluoro guidance.  Following this, a microsheath was exchanged for a 5 South African 

short sheath, which was appropriately flushed.  Next, UF flushed catheter was 

inserted into the abdominal aorta and then an abdominal aortogram was performed.



Abdominal aortogram findings are as follows:  The right and left renal arteries 

appear widely patent.  The proximal, mid and distal abdominal aorta appear 

widely patent.  The patient has high-grade stenosis of the right and left common

iliac arteries at their origin, which I would estimate greater than 90%.  There 

is some stenosis in the right external iliac artery, which I had estimated 

approximately 60-70%.  There is calcified disease in the left external iliac 

artery, but the lumen appears patent and preserved.  The hypogastric arteries 

appear patent bilaterally.  The right and left common femoral arteries are also 

patent.



At this point in time, using ultrasound guidance, the right common femoral 

artery was directly visualized and then 1% lidocaine was infiltrated underneath 

the skin.  Next, a micropuncture needle was used to access the right common 

femoral artery under direct ultrasound guidance.  This was followed by microwire

under fluoroscopy guidance.  Next, a small skin incision was made.  Needle was 

removed and a microsheath was inserted.  Following this, a 0.035 Bentson wire 

was inserted into abdominal aorta and then a microsheath was exchanged for a 

6-South African 25 cm sheath, which was positioned into the abdominal aorta.  Prior to 

doing that, I did perform a selective right lower extremity angiogram.



The right lower extremity angiogram findings are as follows:  The right common 

femoral artery appears widely patent.  The right profunda femoral artery is 

widely patent.  Superficial femoral artery is heavily diseased at its origin, 

but there is a small remnant of the vessel and then there was a chronic total 

occlusion of the superficial femoral artery at the adductor canal with 

reconstitution of the above knee popliteal artery.  I did not visualize the 

tibial vessels very well below the knee and I did want to limit our contrast dye

given our intervention.



At this point in time, our 5-South African sheath on the left was exchanged for a 

6-South African 25 cm sheath, which was matched to the other sheath and both sheaths 

were appropriately flushed.  Following this, the patient was heparinized per 

weight-based protocol.  Next, two 7 x 39 stents were positioned into the common 

iliac arteries in a kissing fashion and then these were inflated to nominal 

pressure in a kissing fashion.  Completion angiogram revealed markedly improved 

flow through the common iliac arteries.  Next, a Slovenian projected angiogram was 

performed which opened up the right external iliac lesion and then this lesion 

was ballooned using a 6 x 40 standard angioplasty balloon followed by a 7 x 40 

drug-eluting balloon for a total of 3 minutes.  Completion angiogram revealed 

brisk flow through the iliac arteries bilaterally at this point. The vessels 

were determined safe for closure.  Next, a StarClose device was inserted on the 

right followed by the left.  The patient had excellent hemostasis at the access 

sites and was transferred to the post-catheterization area in stable condition.







CL BAÑUELOS: Matheus   DD: 06/30/2021 10:23

DT: 06/30/2021 11:25   TID: [id number]

## 2021-06-30 NOTE — PDOC
Provider Note


Date of Service:


DATE: 6/30/21 


TIME: 08:29


Provider Note


no temp, cults neg- glucose lowish after lantus 50 so red to 40- for arteriogram

today re PAD findings





Justifications for Admission


Other Justification














PAULETTE MERA MD             Jun 30, 2021 08:30

## 2021-06-30 NOTE — PDOC
Infectious Disease Note


Subjective


Subjective


Patient feels better





ROS


ROS


no n/v/d/





Vital Sign


Vital Signs





Vital Signs








  Date Time  Temp Pulse Resp B/P (MAP) Pulse Ox O2 Delivery O2 Flow Rate FiO2


 


6/30/21 07:00 97.7 53 18 176/46 (89) 98 Room Air  





 97.7       











Physical Exam


PHYSICAL EXAM


GENERAL:  Alert, oriented x 3 male lying in bed comfortably, in no acute 


distress.


HEENT:  Normocephalic, atraumatic.  Anicteric.


NECK:  Supple, no JVD.


LUNGS:  Clear bilaterally.  No wheezing.


HEART:  S1, S2.  No gallops or murmurs.


ABDOMEN:  Soft, nontender, nondistended, no guarding.


EXTREMITIES:  Right foot swelling mainly over the dorsum slightly improved.  

There is a blister 


With possible underlying callus on the left fifth metatarsal area.  No draining 

sinuses noted. Tenderness and 


warmth noted.  No calf tenderness.


NEUROLOGIC:  Alert and oriented x 3, grossly nonfocal.


PSYCHIATRIC:  Calm and cooperative.





Labs


Lab





Laboratory Tests








Test


 6/30/21


03:35 6/30/21


07:22


 


Sodium Level


 143 mmol/L


(136-145) 





 


Potassium Level


 3.4 mmol/L


(3.5-5.1) 





 


Chloride Level


 107 mmol/L


() 





 


Carbon Dioxide Level


 24 mmol/L


(21-32) 





 


Anion Gap 12 (6-14)  


 


Blood Urea Nitrogen


 21 mg/dL


(8-26) 





 


Creatinine


 1.4 mg/dL


(0.7-1.3) 





 


Estimated GFR


(Cockcroft-Gault) 50.1 


 





 


Glucose Level


 62 mg/dL


(70-99) 





 


Calcium Level


 8.3 mg/dL


(8.5-10.1) 





 


Glucose (Fingerstick)


 


 79 mg/dL


(70-99)








Micro





Microbiology


6/25/21 Blood Culture - Preliminary, Resulted


          NO GROWTH AFTER 3 DAYS





Objective


Assessment





1.  Cellulitis of the right foot.Blister with possible underlying callus on the 

lateral aspect of the right foot,


2.  History of right ankle surgery with hardware in place; Concern for deep 

tissue infection.


3.  Chronic kidney disease.


4.  Diabetes mellitus with neuropathy, uncontrolled.


5.  Hypertension/hyperlipidemia.


6.  Peripheral arterial disease.


7.  Coronary artery disease, status post bypass.


8.  Benign prostatic hypertrophy.





Plan


Plan of Care


dc daptomycin and Zosyn.,,,  change to po augmentin


d/w wife


Wound care as directed by wound team





d/w Vascular surgery





culture neg


Elevate right lower extremity.


 Follow up blood cultures.


 Monitor labs.





Discussed with nursing staff











LIBRA GARCÍA MD               Jun 30, 2021 09:21

## 2021-06-30 NOTE — RAD
EXAM: Bilateral lower extremity venous mapping.



HISTORY: Preoperative evaluation. Right leg bypass.



TECHNIQUE: Sonographic imaging of the lower extremity veins was performed.



COMPARISON: None.



FINDINGS: The right greater saphenous vein measures 2.4 mm within the proximal thigh, 1.2 mL within t
he upper mid thigh, 1.4 mm within the mid thigh, 1.4 mm at the level of the knee, 1.5 mm within the p
roximal calf, 2.3 mm within the mid calf and 2.5 mm at the ankle. The right lesser saphenous vein orin
sures 1.3 mm within the proximal calf, 0.9 mm within the mid calf and 1.7 mm within the distal calf.



There has been left greater saphenous vein stripping. The left lesser saphenous vein is very small in
 caliber and difficult to measure within its mid and distal aspects. The lesser saphenous vein measur
es 1.6 mm within the proximal calf.



IMPRESSION:

1. Findings consistent with prior left greater saphenous vein stripping.

2. Right greater saphenous vein and lesser saphenous vein caliber measurements, described above.

3. Small caliber left lesser saphenous vein, only measurable within its proximal aspect as described 
above.



Electronically signed by: Oly Simental MD (6/30/2021 1:16 PM) [id number]

## 2021-06-30 NOTE — PDOC2
DEISY ROBLEDO APRN 6/30/21 1552:


CARDIAC CONSULT


DATE OF CONSULT


Date of Consult


DATE: 6/30/21 


TIME: 15:29





REASON FOR CONSULT


Reason for Consult:


preop clearance





REFERRING PHYSICIAN


Referring Physician:


Jorje





SOURCE


Source:  Chart review, Patient





HISTORY OF PRESENT ILLNESS


HISTORY OF PRESENT ILLNESS


This is a 69 yo male admitted for complains of right foot pain and poor healing 

wound. Further testing revealed severe PAD. He has been having LE pain. Consult 

is for preop eval but pt ended up having the percutaneous revascularization of 

bilateral LE before being seen. He has tolerated the procedure well and denies 

any chest pain or SOA.





PAST MEDICAL HISTORY


Past Medical History


Cardiovascular:  CAD, HTN, Hyperlipidemia, PAD


Pulmonary:  No pertinent hx


CENTRAL NERVOUS SYSTEM:  Other (No pertinent history)


GI:  GERD


Heme/Onc:  No pertinent hx


Hepatobiliary:  No pertinent hx


Psych:  No pertinent hx


Musculoskeletal:  Osteoarthritis


Rheumatologic:  No pertinent hx


Infectious disease:  No pertinent hx


ENT:  No pertinent hx


Renal/:  No pertinent hx


Endocrine:  Diabetes (2)


Dermatology:  No pertinent hx





PAST SURGICAL HISTORY


Past Surgical History


CABG (x3 8/2012), Other (right ankle surgery; cochlear implant to left ear)





FAMILY HISTORY


Family History


noncontributory





SOCIAL HISTORY


Smoke:  No


ALCOHOL:  none


Drugs:  None


Lives:  with Family





CURRENT MEDICATIONS


CURRENT MEDICATIONS





Current Medications








 Medications


  (Trade)  Dose


 Ordered  Sig/Maribell


 Route


 PRN Reason  Start Time


 Stop Time Status Last Admin


Dose Admin


 


 Insulin Glargine


  (Lantus Syringe)  50 unit  HS


 SQ


   6/29/21 21:00


 6/30/21 08:29 DC 6/29/21 21:32





 


 Morphine Sulfate


  (Morphine


 Sulfate)  6 mg  1X  ONCE


 IV


   6/30/21 04:00


 6/30/21 04:01 DC 6/30/21 04:04





 


 Amoxicillin/


 Clavulanate


 Potassium


  (Augmentin 875/


 125mg)  1 tab  BID


 PO


   6/30/21 10:00


    6/30/21 12:07





 


 Heparin Sodium/


 Sodium Chloride


  (HEPARIN for


 ARTERIAL LINE


 FLUSH)  1,000 unit  1X  ONCE


 IART


   6/30/21 09:30


 6/30/21 09:34 DC 6/30/21 09:30





 


 Midazolam HCl


  (Versed)  5 mg  1X  ONCE


 IV


   6/30/21 09:30


 6/30/21 09:34 DC 6/30/21 09:30





 


 Fentanyl Citrate


  (Fentanyl 5ml


 Vial)  250 mcg  1X  ONCE


 IV


   6/30/21 09:30


 6/30/21 09:34 DC 6/30/21 09:30





 


 Lidocaine HCl


  (Lidocaine 1%


 20ml Vial)  20 ml  1X  ONCE


 INJ


   6/30/21 09:30


 6/30/21 09:34 DC 6/30/21 09:30





 


 Labetalol HCl


  (Normodyne Iv


 Push)  20 mg  1X  ONCE


 IVP


   6/30/21 09:30


 6/30/21 09:34 DC 6/30/21 09:30





 


 Clopidogrel


 Bisulfate


  (Plavix)  300 mg  1X  ONCE


 PO


   6/30/21 09:45


 6/30/21 09:46 DC 6/30/21 09:45





 


 Heparin Sodium/


 Sodium Chloride


  (HEPARIN for


 ARTERIAL LINE


 FLUSH)  1,000 unit  1X  ONCE


 IART


   6/30/21 09:45


 6/30/21 09:46 DC 6/30/21 09:45





 


 Clopidogrel


 Bisulfate


  (Plavix)  75 mg  1X  ONCE


 PO


   6/30/21 10:15


 6/30/21 10:25 DC 6/30/21 12:07














ALLERGIES


ALLERGIES:  


Coded Allergies:  


     No Known Drug Allergies (Unverified , 10/21/20)





ROS


Review of System


14 point ROS evaluated with pertinent positives noted per HPI





PHYSICAL EXAM


General:  Alert, Oriented X3, Cooperative, No acute distress


HEENT:  Atraumatic, Mucous membr. moist/pink


Lungs:  Clear to auscultation, Normal air movement


Heart:  Regular rate (SR), Normal S1, Normal S2, No murmurs


Abdomen:  Soft, No tenderness


Extremities:  No cyanosis, No edema


Skin:  No breakdown, No significant lesion


Neuro:  Normal speech, Sensation intact


Psych/Mental Status:  Mental status NL, Mood NL


MUSCULOSKELETAL:  Osteoarthritic changes both hands





VITALS/I&O


VITALS/I&O:





                                   Vital Signs








  Date Time  Temp Pulse Resp B/P (MAP) Pulse Ox O2 Delivery O2 Flow Rate FiO2


 


6/30/21 11:11  62  154/55    


 


6/30/21 10:17   16  99 Room Air  


 


6/30/21 09:30       2.0 


 


6/30/21 07:00 97.7       





 97.7       














                                    I & O   


 


 6/29/21 6/29/21 6/30/21





 15:00 23:00 07:00


 


Intake Total 50 ml 120 ml 2640 ml


 


Balance 50 ml 120 ml 2640 ml











LABS


Lab:





                                Laboratory Tests








Test


 6/30/21


03:35 6/30/21


07:22


 


Sodium Level


 143 mmol/L


(136-145) 





 


Potassium Level


 3.4 mmol/L


(3.5-5.1)  L 





 


Chloride Level


 107 mmol/L


() 





 


Carbon Dioxide Level


 24 mmol/L


(21-32) 





 


Anion Gap 12 (6-14)   


 


Blood Urea Nitrogen


 21 mg/dL


(8-26) 





 


Creatinine


 1.4 mg/dL


(0.7-1.3)  H 





 


Estimated GFR


(Cockcroft-Gault) 50.1  


 





 


Glucose Level


 62 mg/dL


(70-99)  L 





 


Calcium Level


 8.3 mg/dL


(8.5-10.1)  L 





 


Glucose (Fingerstick)


 


 79 mg/dL


(70-99)





                                Laboratory Tests


6/30/21 03:35











STRESS TEST


STRESS TEST


<Conclusion>


The left ventricle is normal size.


The left ventricular systolic function is normal and the ejection fraction is 

within normal range.


The Ejection Fraction is 50-55%.


There is mild concentric left ventricular hypertrophy.


There is no significant aortic valvular stenosis.


Doppler and Color Flow revealed no significant aortic regurgitation.


Doppler and Color-flow revealed trace mitral regurgitation.


Doppler and Color Flow revealed trace tricuspid regurgitation with an estimated 

PAP of 40 mmHg.





DATE:     08/22/19 1208





HEART CATH


HEART CATH


Conclusion


1. Normal biventricular filling pressures. 


2. No pulmonary HTN


3. Normal cardiac output. 


4. Severe two vessel coronary artery disease involving the LAD/Diagonal. 


5. Patent LIMA, SVG to D1/LPL sequential graft. 





Recommendations


No obvious cardiac source of dyspnea noted. 


Plan for echocardiogram to rule out mitral or tricuspic insufficiency. 


Plan for 6 minute groves walk to rule out pulmonary source.





DATE:     06/01/17 1120





ASSESSMENT/PLAN


ASSESSMENT/PLAN


1. Severe LE PAD with claudications: S/P bilateral PTA with stent placement to 

LCIA/RCIA and PTA to REIA per vascular. tolerated procedure well


2. CAD: past CABG. clinically stable


3. HTN: controlled


4. HLP


5. DM2: per PCP


6. CKD3





Recommendations


1. Continue secondary prevention measures. repalce K


2. Supportive care





JUAN PABLO JETT MD 6/30/21 8941:


CARDIAC CONSULT


ASSESSMENT/PLAN


ASSESSMENT/PLAN


The patient was seen and interviewed as well as examined at the bedside. The 

chart was reviewed. The case was discussed. Agree with the plan of care.


No further CV testing needed prior to planned RLE bypass. 


Supportive care. Discussed with Dr. Etienne.











DEISY ROBLEDO          Jun 30, 2021 15:52


JUAN PABLO JETT MD       Jun 30, 2021 18:31

## 2021-06-30 NOTE — PDOC
BRIEF OPERATIVE NOTE


Date:  Jun 30, 2021


Pre-Op Diagnosis


Atherosclerosis right leg ulceration


PAD


HTN (uncontrolled)


HLD


Post-Op Diagnosis


Same


Procedure Performed


Bilateral kissing common iliac stents 7x39


Right EIA VICTORIA 7x40


Aortogram with right leg runnoff


Bilateral Starclose


Surgeon


Ibis Machado DO, FACS


Anesthesia Type:  Conscious Sedation


Blood Loss


none


Findings


Chronic  right SFA.  Will need Fem-pop bypass


Complications


none











IBIS MACHADO DO                  Jun 30, 2021 10:34

## 2021-07-01 NOTE — PDOC
Infectious Disease Note


Subjective


Subjective


Patient feels better 


ready to go home





ROS


ROS


no n/v/d/





Vital Sign


Vital Signs





Vital Signs








  Date Time  Temp Pulse Resp B/P (MAP) Pulse Ox O2 Delivery O2 Flow Rate FiO2


 


7/1/21 08:18  65  166/112    


 


7/1/21 07:20      Room Air  


 


7/1/21 03:00 98.3  18  97   





 98.3       


 


6/30/21 09:30       2.0 











Physical Exam


PHYSICAL EXAM


GENERAL:  Alert, oriented x 3 male lying in bed comfortably, in no acute 


distress.


HEENT:  Normocephalic, atraumatic.  Anicteric.


NECK:  Supple, no JVD.


LUNGS:  Clear bilaterally.  No wheezing.


HEART:  S1, S2.  No gallops or murmurs.


ABDOMEN:  Soft, nontender, nondistended, no guarding.


EXTREMITIES:  Right foot swelling mainly over the dorsum slightly improved.  

There is a blister 


With possible underlying callus on the left fifth metatarsal area.  No draining 

sinuses noted. Tenderness and 


warmth noted.  No calf tenderness.


NEUROLOGIC:  Alert and oriented x 3, grossly nonfocal.


PSYCHIATRIC:  Calm and cooperative.





Labs


Micro





Microbiology


6/25/21 Blood Culture - Preliminary, Resulted


          NO GROWTH AFTER 3 DAYS





Objective


Assessment





1.  Cellulitis of the right foot.Blister with possible underlying callus on the 

lateral aspect of the right foot,


2.  History of right ankle surgery with hardware in place; Concern for deep 

tissue infection.


3.  Chronic kidney disease.


4.  Diabetes mellitus with neuropathy, uncontrolled.


5.  Hypertension/hyperlipidemia.


6.  Peripheral arterial disease.


7.  Coronary artery disease, status post bypass.


8.  Benign prostatic hypertrophy.





Plan


Plan of Care


 po augmentin


d/w wife





s/p angioplasty





culture neg





ok to d/c 


d/w Dr Newell





Discussed with nursing staff











LIBRA GARCÍA MD                Jul 1, 2021 08:25

## 2021-07-01 NOTE — PDOC
PROGRESS NOTES


Date of Service


DATE: 7/1/21 


TIME: 14:04





Subjective


Subjective


Patient seen and examined in room.  Family at bedside.


Patient is anxious to discharge at home.  He would like to go home over the 

weekend and then follow-up next week with plans for surgery.


Patient with improvement in right leg pain.





Objective


Objective





Vital Signs








  Date Time  Temp Pulse Resp B/P (MAP) Pulse Ox O2 Delivery O2 Flow Rate FiO2


 


7/1/21 11:00 97.5 70 17 171/63 (99) 96 Room Air  





 97.5       


 


6/30/21 09:30       2.0 














Intake and Output 


 


 7/1/21





 07:00


 


Intake Total 800 ml


 


Output Total 850 ml


 


Balance -50 ml


 


 


 


IV Total 800 ml


 


Output Urine Total 850 ml


 


# Voids 3


 


# Bowel Movements 2











Physical Exam


Physical Exam


Awake and alert


Heart rate regular


Bilateral femoral access site dressings dry and intact.  No hematoma or 

swelling.


Bilateral feet warm, capillary refill less than 3 seconds.  Right lateral foot 

wound is clean, no drainage, minimal erythema.  Doppler signal present DP and PT

monophasic.


Motor and sensation intact





Assessment


Assessment


Problems


Medical Problems:


(1) Type 2 diabetes mellitus


Status: Acute  











Plan


Plan of Care


72-year-old male with peripheral arterial disease status post angioplasty and 

bilateral iliac stent placement.  Patient has chronic total occlusion of right 

SFA we recommend right femoral to popliteal bypass.  Patient is anxious and 

would like to discharge home and return for surgery.  We have scheduled the 

patient to see us in our office with repeat vein mapping next week.  We will 

schedule surgery in the near future.  Recommend continue local wound care and 

antibiotics per infectious disease. Continue daily aspirin, plavix and daily 

statin.





Comment


Review of Relevant


I have reviewed the following items fide (where applicable) has been applied.


Labs





Laboratory Tests








Test


 6/30/21


03:35 6/30/21


07:22 7/1/21


11:46


 


Sodium Level


 143 mmol/L


(136-145) 


 





 


Potassium Level


 3.4 mmol/L


(3.5-5.1) 


 





 


Chloride Level


 107 mmol/L


() 


 





 


Carbon Dioxide Level


 24 mmol/L


(21-32) 


 





 


Anion Gap 12 (6-14)   


 


Blood Urea Nitrogen


 21 mg/dL


(8-26) 


 





 


Creatinine


 1.4 mg/dL


(0.7-1.3) 


 





 


Estimated GFR


(Cockcroft-Gault) 50.1 


 


 





 


Glucose Level


 62 mg/dL


(70-99) 


 





 


Calcium Level


 8.3 mg/dL


(8.5-10.1) 


 





 


Glucose (Fingerstick)


 


 79 mg/dL


(70-99) 220 mg/dL


(70-99)








Laboratory Tests








Test


 7/1/21


11:46


 


Glucose (Fingerstick)


 220 mg/dL


(70-99)








Microbiology


6/27/21 Gram Stain - Final, Resulted


          


6/27/21 Aerobic and Anaerobic Culture - Preliminary, Resulted


          


6/25/21 Blood Culture - Final, Complete


          NO GROWTH AFTER 5 DAYS


Medications





Current Medications


Vancomycin HCl 2 gm/Sodium Chloride 500 ml @  250 mls/hr 1X  ONCE IV  Last 

administered on 6/24/21at 21:54;  Start 6/24/21 at 21:15;  Stop 6/24/21 at 

23:14;  Status DC


Ondansetron HCl (Zofran) 4 mg PRN Q8HRS  PRN IV NAUSEA/VOMITING Last 

administered on 6/25/21at 17:38;  Start 6/24/21 at 22:45;  Stop 6/25/21 at 

22:44;  Status DC


Acetaminophen/ Hydrocodone Bitart (Lortab 5/325) 1 tab 1X  ONCE PO  Last 

administered on 6/25/21at 00:35;  Start 6/25/21 at 00:30;  Stop 6/25/21 at 

00:32;  Status DC


Aspirin (Aspirin Chewable) 81 mg DAILY08 PO  Last administered on 7/1/21at 

08:17;  Start 6/25/21 at 08:00


Docusate Sodium (Colace) 100 mg BID PO  Last administered on 7/1/21at 08:17;  

Start 6/25/21 at 09:00


Insulin Glargine (Lantus Syringe) 90 unit HS SQ  Last administered on 6/25/21at 

21:22;  Start 6/25/21 at 21:00;  Stop 6/26/21 at 14:58;  Status DC


Metformin HCl (Glucophage) 500 mg BIDWMEALS PO  Last administered on 6/26/21at 

08:34;  Start 6/25/21 at 08:00;  Stop 6/26/21 at 14:43;  Status DC


Methocarbamol (Robaxin) 750 mg PRN TID  PRN PO MUSCLE SPASMS Last administered 

on 7/1/21at 04:12;  Start 6/25/21 at 06:30


Oxycodone HCl (OxyCONTIN) 10 mg BID PO ;  Start 6/25/21 at 09:00;  Stop 6/25/21 

at 07:12;  Status DC


Oxycodone/ Acetaminophen (Percocet 5/325) 1 tab PRN Q6HRS  PRN PO PAIN Last 

administered on 6/25/21at 06:44;  Start 6/25/21 at 06:30;  Stop 6/25/21 at 

07:12;  Status DC


Tamsulosin HCl (Flomax) 0.4 mg HS PO  Last administered on 6/30/21at 20:49;  

Start 6/25/21 at 21:00


Hydrochlorothiazide (Hydrodiuril) 25 mg DAILY PO ;  Start 6/25/21 at 09:00;  

Stop 6/25/21 at 07:23;  Status DC


Multivitamins (Thera M Plus) 1 tab DAILY PO  Last administered on 7/1/21at 

08:17;  Start 6/25/21 at 09:00


Pregabalin (Lyrica) 100 mg TID PO  Last administered on 6/26/21at 14:40;  Start 

6/25/21 at 09:00;  Stop 6/26/21 at 14:43;  Status DC


Atorvastatin Calcium (Lipitor) 40 mg QHS PO  Last administered on 6/30/21at 

20:49;  Start 6/25/21 at 21:00


Acetaminophen/ Hydrocodone Bitart (Lortab 10/325) 1 tab PRN Q6HRS  PRN PO 

MODERATE TO SEVERE PAIN Last administered on 7/1/21at 04:12;  Start 6/25/21 at 

07:15


Hydrochlorothiazide (Hydrodiuril) 12.5 mg DAILY PO  Last administered on 

7/1/21at 08:16;  Start 6/25/21 at 09:00


Amlodipine Besylate (Norvasc) 5 mg DAILY PO  Last administered on 7/1/21at 

08:18;  Start 6/25/21 at 09:00


Daptomycin 490 mg/ Sodium Chloride 50 ml @  100 mls/hr Q24H IV  Last 

administered on 6/29/21at 12:33;  Start 6/25/21 at 12:00;  Stop 6/30/21 at 

09:20;  Status DC


Piperacillin Sod/ Tazobactam Sod 3.375 gm/Sodium Chloride 50 ml @  100 mls/hr 

Q6HRS IV  Last administered on 6/30/21at 06:11;  Start 6/25/21 at 12:00;  Stop 

6/30/21 at 09:20;  Status DC


Lactobacillus Rhamnosus (Culturelle) 1 cap BID PO  Last administered on 7/1/21at

08:17;  Start 6/25/21 at 21:00


Ondansetron HCl (Zofran) 4 mg PRN Q4HRS  PRN IVP NAUSEA/VOMITING Last 

administered on 6/30/21at 11:11;  Start 6/25/21 at 21:30


Dextrose (Dextrose 50%-Water Syringe) 12.5 gm PRN Q15MIN  PRN IV SEE COMMENTS;  

Start 6/26/21 at 13:00;  Stop 6/27/21 at 15:46;  Status DC


Insulin Human Lispro (HumaLOG) 0-7 UNITS TIDWMEALS SQ  Last administered on 

6/28/21at 12:16;  Start 6/26/21 at 17:00;  Stop 6/29/21 at 09:10;  Status DC


Dextrose (Dextrose 50%-Water Syringe) 12.5 gm PRN Q15MIN  PRN IV SEE COMMENTS;  

Start 6/26/21 at 13:15


Metformin HCl (Glucophage) 1,000 mg BIDWMEALS PO ;  Start 6/26/21 at 17:00;  

Stop 6/26/21 at 17:02;  Status DC


Pregabalin (Lyrica) 50 mg TID PO  Last administered on 6/28/21at 20:58;  Start 

6/26/21 at 15:00;  Stop 6/29/21 at 07:54;  Status DC


Pantoprazole Sodium (Protonix) 40 mg Q24H PO  Last administered on 6/30/21at 

18:04;  Start 6/26/21 at 18:30


Insulin Glargine (Lantus Syringe) 95 unit HS SQ  Last administered on 6/28/21at 

21:02;  Start 6/26/21 at 21:00;  Stop 6/29/21 at 09:10;  Status DC


Metformin HCl (Glucophage) 500 mg DAILYWBKFT PO  Last administered on 6/30/21at 

12:07;  Start 6/27/21 at 08:00


Pregabalin (Lyrica) 100 mg [id number] PO  Last administered on 7/1/21at 08:17;  

Start 6/29/21 at 09:00;  Stop 7/1/21 at 08:45;  Status DC


Insulin Glargine (Lantus Syringe) 50 unit HS SQ  Last administered on 6/29/21at 

21:32;  Start 6/29/21 at 21:00;  Stop 6/30/21 at 08:29;  Status DC


Sodium Chloride 1,000 ml @  80 mls/hr [id number] IV  Last administered on 6/29/21at

14:41;  Start 6/29/21 at 13:30;  Stop 7/1/21 at 08:45;  Status DC


Sodium Chloride 1,000 ml @  100 mls/hr Q10H IV  Last administered on 7/1/21at 

05:09;  Start 6/29/21 at 13:30;  Stop 7/1/21 at 08:45;  Status DC


Morphine Sulfate (Morphine Sulfate) 6 mg 1X  ONCE IV  Last administered on 

6/30/21at 04:04;  Start 6/30/21 at 04:00;  Stop 6/30/21 at 04:01;  Status DC


Iodixanol (Visipaque 320) 100 ml STK-MED ONCE .ROUTE ;  Start 6/30/21 at 07:46; 

Stop 6/30/21 at 07:47;  Status DC


Heparin Sodium/ Sodium Chloride 1,000 ml @  As Directed STK-MED ONCE .ROUTE ;  

Start 6/30/21 at 07:46;  Stop 6/30/21 at 07:47;  Status DC


Lidocaine HCl (Lidocaine 1% 20ml Vial) 20 ml STK-MED ONCE .ROUTE ;  Start 

6/30/21 at 07:47;  Stop 6/30/21 at 07:47;  Status DC


Insulin Glargine (Lantus Syringe) 40 unit HS SQ  Last administered on 6/30/21at 

20:58;  Start 6/30/21 at 21:00;  Stop 7/1/21 at 08:45;  Status DC


Midazolam HCl (Versed) 5 mg STK-MED ONCE .ROUTE ;  Start 6/30/21 at 08:33;  Stop

6/30/21 at 08:34;  Status DC


Fentanyl Citrate (Fentanyl 5ml Vial) 250 mcg STK-MED ONCE .ROUTE ;  Start 

6/30/21 at 08:34;  Stop 6/30/21 at 08:34;  Status DC


Heparin Sodium (Porcine) (Heparin Sodium) 10,000 unit STK-MED ONCE .ROUTE ;  

Start 6/30/21 at 08:34;  Stop 6/30/21 at 08:34;  Status DC


Labetalol HCl (Normodyne Iv Push) 20 mg STK-MED ONCE IVP ;  Start 6/30/21 at 

08:58;  Stop 6/30/21 at 08:58;  Status DC


Amoxicillin/ Clavulanate Potassium (Augmentin 875/ 125mg) 1 tab BID PO  Last 

administered on 7/1/21at 08:17;  Start 6/30/21 at 10:00


Heparin Sodium/ Sodium Chloride (HEPARIN for ARTERIAL LINE FLUSH) 1,000 unit 1X 

ONCE IART  Last administered on 6/30/21at 09:30;  Start 6/30/21 at 09:30;  Stop 

6/30/21 at 09:34;  Status DC


Midazolam HCl (Versed) 5 mg 1X  ONCE IV  Last administered on 6/30/21at 09:30;  

Start 6/30/21 at 09:30;  Stop 6/30/21 at 09:34;  Status DC


Fentanyl Citrate (Fentanyl 5ml Vial) 250 mcg 1X  ONCE IV  Last administered on 

6/30/21at 09:30;  Start 6/30/21 at 09:30;  Stop 6/30/21 at 09:34;  Status DC


Lidocaine HCl (Lidocaine 1% 20ml Vial) 20 ml 1X  ONCE INJ  Last administered on 

6/30/21at 09:30;  Start 6/30/21 at 09:30;  Stop 6/30/21 at 09:34;  Status DC


Labetalol HCl (Normodyne Iv Push) 20 mg 1X  ONCE IVP  Last administered on 6/3

0/21at 09:30;  Start 6/30/21 at 09:30;  Stop 6/30/21 at 09:34;  Status DC


Clopidogrel Bisulfate (Plavix) 75 mg STK-MED ONCE .ROUTE ;  Start 6/30/21 at 

09:33;  Stop 6/30/21 at 09:34;  Status DC


Clopidogrel Bisulfate (Plavix) 300 mg 1X  ONCE PO  Last administered on 

6/30/21at 09:45;  Start 6/30/21 at 09:45;  Stop 6/30/21 at 09:46;  Status DC


Heparin Sodium/ Sodium Chloride (HEPARIN for ARTERIAL LINE FLUSH) 1,000 unit 1X 

ONCE IART  Last administered on 6/30/21at 09:45;  Start 6/30/21 at 09:45;  Stop 

6/30/21 at 09:46;  Status DC


Hydralazine HCl (Apresoline Inj) 20 mg STK-MED ONCE .ROUTE ;  Start 6/30/21 at 

10:04;  Stop 6/30/21 at 10:04;  Status DC


Clopidogrel Bisulfate (Plavix) 75 mg 1X  ONCE PO  Last administered on 6/30/21at

12:07;  Start 6/30/21 at 10:15;  Stop 6/30/21 at 10:25;  Status DC


Potassium Chloride (Klor-Con) 20 meq 1X  ONCE PO  Last administered on 6/30/21at

16:34;  Start 6/30/21 at 15:30;  Stop 6/30/21 at 15:51;  Status DC


Insulin Glargine (Lantus Syringe) 30 unit HS SQ ;  Start 7/1/21 at 21:00


Pregabalin (Lyrica) 150 mg [id number] PO ;  Start 7/1/21 at 16:00


Pregabalin (Lyrica) 50 mg 1X  STAT PO  Last administered on 7/1/21at 09:59;  

Start 7/1/21 at 08:42;  Stop 7/1/21 at 08:47;  Status DC


Heparin Sodium (Porcine) 5000 unit/Sodium Chloride 505 ml @  505 mls/hr 1X  ONCE

IRR ;  Start 7/2/21 at 06:00;  Stop 7/2/21 at 06:59


Cefazolin Sodium 1 gm/Sodium Chloride 500 ml @  500 mls/hr 1X  ONCE IRR ;  Start

7/2/21 at 06:00;  Stop 7/2/21 at 06:59





Active Scripts


Active


Oxycontin ** (Oxycodone HCl) 10 Mg Tab.er.12h 1 Tab PO BID MDD 2 Tablet(s) 30 

Days


Dok (Docusate Sodium) 100 Mg Capsule 100 Mg PO BID


Methocarbamol 750 Mg Tablet 750 Mg PO TID PRN


Reported


Lyrica (Pregabalin) 100 Mg Capsule 1 Cap PO TID


Percocet 5-325 Mg Tablet ** (Oxycodone/Acetaminophen) 1 Each Tablet 1 Tab PO PRN

Q6HRS PRN


Metformin Hcl 500 Mg Tablet 500 Mg PO BIDWMEALS


Crestor (Rosuvastatin Calcium) 10 Mg Tablet 1 Tab PO DAILY


Hydrochlorothiazide Tablet (Hydrochlorothiazide) 50 Mg Tablet 25 Mg PO DAILY


Flomax (Tamsulosin Hcl) 0.4 Mg Cap.er.24h 1 Cap PO HS


Multi-Day Vitamins (Multivitamin) 1 Each Tablet 1 Tab PO DAILY


Aspirin 81 Mg Tab.chew 1 Tab PO DAILY


Lantus (Insulin Glargine,Hum.rec.anlog) 100 Unit/1 Ml Vial 90 Unit SQ HS


Vitals/I & O





Vital Sign - Last 24 Hours








 6/30/21 6/30/21 6/30/21 6/30/21





 15:00 19:00 19:40 22:16


 


Temp 97.9 98.9  





 97.9 98.9  


 


Pulse 65 70  


 


Resp 18 18  


 


B/P (MAP) 148/65 (92) 160/63 (95)  


 


Pulse Ox 97 96  


 


O2 Delivery Room Air Room Air Room Air Room Air


 


    





    





 6/30/21 7/1/21 7/1/21 7/1/21





 23:00 03:00 04:12 05:11


 


Temp 98.9 98.3  





 98.9 98.3  


 


Pulse 67 52  


 


Resp 18 18  


 


B/P (MAP) 133/55 (81) 139/80 (99)  


 


Pulse Ox 96 97  


 


O2 Delivery Room Air Room Air Room Air Room Air


 


    





    





 7/1/21 7/1/21 7/1/21 7/1/21





 07:00 07:20 08:18 11:00


 


Temp 97.5   97.5





 97.5   97.5


 


Pulse 65  65 70


 


Resp 16   17


 


B/P (MAP) 166/112 (130)  166/112 171/63 (99)


 


Pulse Ox 96   96


 


O2 Delivery Room Air Room Air  Room Air














Intake and Output   


 


 6/30/21 6/30/21 7/1/21





 15:00 23:00 07:00


 


Intake Total  800 ml 


 


Output Total 850 ml  


 


Balance -850 ml 800 ml 











Justifications for Admission


Other Justification














CHIDI LARIOS              Jul 1, 2021 14:13

## 2021-07-01 NOTE — CARD
MR#: [id number]

Account#: [id number]

Accession#: [id number]

Date of Study: 07/01/2021

Ordering Physician: JUAN PABLO EJTT, 

Referring Physician: JUAN PABLO JETT, 

Tech: Radha Pantoja, [facility]





--------------- APPROVED REPORT --------------





EXAM: Two-dimensional and M-mode echocardiogram with Doppler and color Doppler.



Other Information 

Quality : AverageHR: 63bpm



INDICATION

Pre-Op 



RISK FACTORS

Hypertension 

Hyperlipidemia

Diabetes



2D DIMENSIONS 

Left Atrium(2D)3.5 (1.6-4.0cm)IVSd1.4 (0.7-1.1cm)

Aortic Root(2D)3.5 (2.0-3.7cm)LVDd5.5 (3.9-5.9cm)

LVOT Diameter2.0 (1.8-2.4cm)PWd1.1 (0.7-1.1cm)

LVDs3.5 (2.5-4.0cm)FS (%) 36.5 %

SV96.5 mlLVEF(%)65.7 (>50%)



Aortic Valve

AoV Peak Dean.174.9cm/sAoV VTI40.8cm

AO Peak GR.12.2mmHgLVOT Peak Dean.154.0cm/s

LVOT  VTI 38.87cmAO Mean GR.6mmHg

RICKI (VMAX)2.82hb9QNK   (VTI)3.02cm2



Mitral Valve

MV E Njynisbd530.9cm/sMV DECEL [id number]

MV A Ewfkjvjp23.8cm/sMV [id number]

E/A  Ratio2.5MVA (PHT)3.66cm2



TDI

E/Lateral E'14.6E/Medial E'21.3



Pulmonary Valve

PV Peak Yufoogqf946.5cm/sPV Peak Grad.5mmHg



Tricuspid Valve

TR P. Vzelwjji937fj/sRAP [id number]

TR Peak Gr.[id number]



 LEFT VENTRICLE 

The left ventricle is normal size. There is mild to moderate concentric left ventricular hypertrophy.
 The left ventricular systolic function is normal. The Ejection Fraction is 55-60%. There is normal L
V segmental wall motion. Transmitral Doppler flow pattern is Grade II-pseudonormal filling dynamics.



 RIGHT VENTRICLE 

The right ventricle is normal size. There is normal right ventricular wall thickness. The right ventr
icular systolic function is normal.



 ATRIA 

The left atrium size is normal. The right atrium size is normal. The interatrial septum is intact wit
h no evidence for an atrial septal defect or patent foramen ovale as noted on 2-D or Doppler imaging.




 AORTIC VALVE 

The aortic valve is normal in structure and function. Doppler and Color Flow revealed no significant 
aortic regurgitation. There is no significant aortic valvular stenosis. Calculated aortic valve area 
is 2.49 cm2 with maximum pressure gradient of 17 mmHg and mean pressure gradient of 8 mmHg.



 MITRAL VALVE 

The mitral valve is normal in structure and function. There is no evidence of mitral valve prolapse. 
There is no mitral valve stenosis. Doppler and Color Flow revealed no mitral valve regurgitation note
d.



 TRICUSPID VALVE 

The tricuspid valve is normal in structure and function. Doppler and Color Flow revealed trace tricus
pid regurgitation with an estimated PAP of 47 mmHg. There is no tricuspid valve stenosis.



 PULMONIC VALVE 

The pulmonic valve is not well visualized. Doppler and Color Flow revealed trace pulmonic valvular re
gurgitation.



 GREAT VESSELS 

The aortic root is normal in size. The ascending aorta is normal in size. The IVC is normal in size a
nd collapses >50% with inspiration.



 PERICARDIAL EFFUSION 

There is no evidence of significant pericardial effusion.



Critical Notification

Critical Value: No



<Conclusion>

The left ventricular systolic function is normal.

The Ejection Fraction is 55-60%.

There is normal LV segmental wall motion.

Trace tricuspid regurgitation with an estimated PAP of 47 mmHg.

There is no evidence of significant pericardial effusion.



Signed by : Mychal Garcia, 

Electronically Approved : 07/01/2021 12:34:29

## 2021-07-02 NOTE — DS
DATE OF DISCHARGE: 07/01/2021

HOSPITAL SUMMARY:  A 70-year-old white male known poorly controlled diabetic 

with PAD and severe neuropathy, came in with pain and swelling and redness of 

his right dorsal lateral foot with a hemorrhagic lesion on the outer foot 

consistent with a source of infection.  Blood cultures and wound culture had no 

growth.  Laboratory studies showed unremarkable renal and hepatic functions.  He

had mild leukocytosis that resolved.  Troponins were negative.  Echo was 

unremarkable.  Arteriogram had evidence of bilateral stenosis in both iliac 

arteries and more in the right leg distally as well.  He was treated with IV 

Cubicin and Zosyn and his cellulitis slowly improved.  It took much less insulin

in the hospital than he normally does ____ his outpatient diet is much more 

problematic than his inpatient diet.  Lyrica was given for neuropathic pain with

good results.  Dose was increased to 150 mg t.i.d. with good results.  He 

underwent bilateral aortogram with femoral runoff and had stents placed in both 

proximal iliac arteries.  However, further evaluation of his right femoral 

popliteal area as an outpatient, but he declines today further.



FINAL DIAGNOSES:

1.  Acute cellulitis of the right dorsal foot.

2.  Peripheral arterial disease, bilateral.

3.  Severe chronic neuropathic pain secondary to diabetes and prior electrical 

injury and cervical stenosis.



OPERATIONS AND PROCEDURES:  Arteriogram with bilateral runoff, stent placement 

in both proximal iliac arteries, incision and drainage of the right lateral foot

wound.



COMPLICATIONS:  None.



CONSULTATIONS:  Dr. Segundo Olivera,  Vascular physicians, Dr. Chicas.



DISPOSITION:  He will take Augmentin 875 mg twice a day for 5 more days.  He 

will start taking Plavix 75 mg daily along with aspirin and continue all his 

home meds.  Lyrica increased to 150 mg 3 times a day, and he will take insulin 

as he does at home or less if his sugars are lower.  He will follow up with  

Vascular for further work on his right leg in 1 week and I will see him in 1-2 

weeks for the infection and diabetes.







VICTORIA/RICH/MICHELET

DR: VICTORIA/alecia   DD: 07/01/2021 14:56

DT: 07/01/2021 15:33   TID: [id number]

## 2021-10-26 NOTE — KCIC
EXAM: Cervical spine CT without contrast.



HISTORY: Pain. Hand numbness.



TECHNIQUE: Computed tomographic images of the cervical spine were obtained without contrast. Multipla
jersey reformatting was performed.



*One or more of the following individualized dose reduction techniques were utilized for this examina
tion:  

1. Automated exposure control.  

2. Adjustment of the mA and/or kV according to patient size.  

3. Use of iterative reconstruction technique.



COMPARISON: 9/20/2020.



FINDINGS: There is instrumented anterior spinal fusion and interbody fusion at C5-C6. There is partia
l bony bridging across the disc space at this level. There is also posterior fusion instrumentation a
t C5-C7. There is lucency surrounding the right C7 screw, suggesting a component of loosening. There 
is mild cervical curvature likely due to thoracic scoliosis. There is multilevel endplate remodeling.
 There is disc space narrowing at C6-C7. There is calcified atherosclerotic plaque involving the pryor
tid bifurcations. The lung apices are unremarkable.



At C2-C3, there is a posterior central disc protrusion superimposed on a disc bulge and endplate ubaldo
deling. There is mild bilateral facet arthropathy. There is mild central canal stenosis.



At C3-C4, there is a posterior central to left paracentral disc protrusion superimposed on a disc bul
ge and endplate remodeling. There is mild left facet arthropathy. There is left uncovertebral arthrop
athy. There is minimal left foraminal stenosis. There is mild central canal stenosis.



At C4-C5, there is a posterior central disc protrusion superimposed on a disc bulge and endplate ubaldo
deling. There is mild lateral facet arthropathy. There is bilateral uncovertebral arthropathy. There 
is mild right and minimal left foraminal stenosis. There is minimal central canal stenosis.



At C5-C6, there is instrumented fusion. There is a disc bulge and endplate osteophytosis. There is mi
ld bilateral facet arthropathy. There is bilateral uncovertebral arthropathy. There is minimal left f
oraminal stenosis. There are right hemilaminectomy changes.



At C6-C7, there is a left lateral recess to foraminal disc protrusion and osteophyte complex superimp
osed on a disc bulge and endplate osteophytosis. There is mild bilateral facet arthropathy. There is 
left greater than right uncovertebral arthropathy. There is mild right and moderate left foraminal st
enosis. There are left hemilaminectomy changes.



IMPRESSION: 

1. Instrumented anterior spinal fusion and interbody fusion at C5-C6. There has been slight increase 
in partial bony bridging across the disc space at this level. There is new posterior spinal instrumen
tation at C5-C7. There is lucency surrounding the right C7 screw suggesting a component of loosening.


2. Multilevel degenerative change involving the cervical spine, described in detail above. This resul
ts in foraminal and central canal stenosis at the aforementioned levels. There has been suspected dec
rease in left foraminal stenosis at C6-C7. The stenosis at the remainder the levels is not significan
tly changed, allowing for differences in imaging technique.



Electronically signed by: Oly Simental MD (10/26/2021 9:10 AM) [id number]
